# Patient Record
Sex: FEMALE | Race: WHITE | NOT HISPANIC OR LATINO | Employment: FULL TIME | URBAN - METROPOLITAN AREA
[De-identification: names, ages, dates, MRNs, and addresses within clinical notes are randomized per-mention and may not be internally consistent; named-entity substitution may affect disease eponyms.]

---

## 2019-03-26 ENCOUNTER — LAB VISIT (OUTPATIENT)
Dept: LAB | Facility: HOSPITAL | Age: 24
End: 2019-03-26
Attending: FAMILY MEDICINE
Payer: COMMERCIAL

## 2019-03-26 ENCOUNTER — CLINICAL SUPPORT (OUTPATIENT)
Dept: CARDIOLOGY | Facility: CLINIC | Age: 24
End: 2019-03-26
Payer: COMMERCIAL

## 2019-03-26 ENCOUNTER — OFFICE VISIT (OUTPATIENT)
Dept: INTERNAL MEDICINE | Facility: CLINIC | Age: 24
End: 2019-03-26
Payer: COMMERCIAL

## 2019-03-26 VITALS
HEIGHT: 69 IN | DIASTOLIC BLOOD PRESSURE: 74 MMHG | WEIGHT: 164 LBS | SYSTOLIC BLOOD PRESSURE: 122 MMHG | TEMPERATURE: 98 F | BODY MASS INDEX: 24.29 KG/M2 | OXYGEN SATURATION: 98 % | HEART RATE: 88 BPM

## 2019-03-26 DIAGNOSIS — E55.9 VITAMIN D DEFICIENCY: ICD-10-CM

## 2019-03-26 DIAGNOSIS — R42 POSTURAL DIZZINESS WITH PRESYNCOPE: ICD-10-CM

## 2019-03-26 DIAGNOSIS — R55 POSTURAL DIZZINESS WITH PRESYNCOPE: ICD-10-CM

## 2019-03-26 DIAGNOSIS — R53.83 FATIGUE, UNSPECIFIED TYPE: ICD-10-CM

## 2019-03-26 DIAGNOSIS — Z98.84 HISTORY OF WEIGHT LOSS SURGERY: ICD-10-CM

## 2019-03-26 DIAGNOSIS — E16.1 REACTIVE HYPOGLYCEMIA: ICD-10-CM

## 2019-03-26 DIAGNOSIS — Z98.84 HISTORY OF WEIGHT LOSS SURGERY: Primary | ICD-10-CM

## 2019-03-26 LAB
25(OH)D3+25(OH)D2 SERPL-MCNC: 61 NG/ML (ref 30–96)
ALBUMIN SERPL BCP-MCNC: 4.3 G/DL (ref 3.5–5.2)
ALP SERPL-CCNC: 65 U/L (ref 55–135)
ALT SERPL W/O P-5'-P-CCNC: 9 U/L (ref 10–44)
ANION GAP SERPL CALC-SCNC: 14 MMOL/L (ref 8–16)
AST SERPL-CCNC: 13 U/L (ref 10–40)
BASOPHILS # BLD AUTO: 0.04 K/UL (ref 0–0.2)
BASOPHILS NFR BLD: 0.4 % (ref 0–1.9)
BILIRUB SERPL-MCNC: 0.8 MG/DL (ref 0.1–1)
BUN SERPL-MCNC: 12 MG/DL (ref 6–20)
CALCIUM SERPL-MCNC: 10.4 MG/DL (ref 8.7–10.5)
CHLORIDE SERPL-SCNC: 105 MMOL/L (ref 95–110)
CO2 SERPL-SCNC: 23 MMOL/L (ref 23–29)
CREAT SERPL-MCNC: 0.8 MG/DL (ref 0.5–1.4)
DIFFERENTIAL METHOD: ABNORMAL
EOSINOPHIL # BLD AUTO: 0.1 K/UL (ref 0–0.5)
EOSINOPHIL NFR BLD: 0.5 % (ref 0–8)
ERYTHROCYTE [DISTWIDTH] IN BLOOD BY AUTOMATED COUNT: 13.2 % (ref 11.5–14.5)
EST. GFR  (AFRICAN AMERICAN): >60 ML/MIN/1.73 M^2
EST. GFR  (NON AFRICAN AMERICAN): >60 ML/MIN/1.73 M^2
FERRITIN SERPL-MCNC: 49 NG/ML (ref 20–300)
FOLATE SERPL-MCNC: 16.2 NG/ML (ref 4–24)
GLUCOSE SERPL-MCNC: 79 MG/DL (ref 70–110)
HCT VFR BLD AUTO: 41.1 % (ref 37–48.5)
HGB BLD-MCNC: 13.2 G/DL (ref 12–16)
IMM GRANULOCYTES # BLD AUTO: 0.03 K/UL (ref 0–0.04)
IMM GRANULOCYTES NFR BLD AUTO: 0.3 % (ref 0–0.5)
IRON SERPL-MCNC: 131 UG/DL (ref 30–160)
LYMPHOCYTES # BLD AUTO: 2.8 K/UL (ref 1–4.8)
LYMPHOCYTES NFR BLD: 24.9 % (ref 18–48)
MCH RBC QN AUTO: 30.3 PG (ref 27–31)
MCHC RBC AUTO-ENTMCNC: 32.1 G/DL (ref 32–36)
MCV RBC AUTO: 95 FL (ref 82–98)
MONOCYTES # BLD AUTO: 0.3 K/UL (ref 0.3–1)
MONOCYTES NFR BLD: 3 % (ref 4–15)
NEUTROPHILS # BLD AUTO: 8.1 K/UL (ref 1.8–7.7)
NEUTROPHILS NFR BLD: 71.2 % (ref 38–73)
NRBC BLD-RTO: 0 /100 WBC
PLATELET # BLD AUTO: 232 K/UL (ref 150–350)
PMV BLD AUTO: 10.7 FL (ref 9.2–12.9)
POTASSIUM SERPL-SCNC: 3.7 MMOL/L (ref 3.5–5.1)
PROT SERPL-MCNC: 7.7 G/DL (ref 6–8.4)
RBC # BLD AUTO: 4.35 M/UL (ref 4–5.4)
SATURATED IRON: 29 % (ref 20–50)
SODIUM SERPL-SCNC: 142 MMOL/L (ref 136–145)
TOTAL IRON BINDING CAPACITY: 451 UG/DL (ref 250–450)
TRANSFERRIN SERPL-MCNC: 305 MG/DL (ref 200–375)
VIT B12 SERPL-MCNC: 257 PG/ML (ref 210–950)
WBC # BLD AUTO: 11.32 K/UL (ref 3.9–12.7)

## 2019-03-26 PROCEDURE — 99999 PR PBB SHADOW E&M-NEW PATIENT-LVL III: CPT | Mod: PBBFAC,,, | Performed by: FAMILY MEDICINE

## 2019-03-26 PROCEDURE — 82746 ASSAY OF FOLIC ACID SERUM: CPT

## 2019-03-26 PROCEDURE — 82607 VITAMIN B-12: CPT

## 2019-03-26 PROCEDURE — 82306 VITAMIN D 25 HYDROXY: CPT

## 2019-03-26 PROCEDURE — 36415 COLL VENOUS BLD VENIPUNCTURE: CPT

## 2019-03-26 PROCEDURE — 85025 COMPLETE CBC W/AUTO DIFF WBC: CPT

## 2019-03-26 PROCEDURE — 3008F BODY MASS INDEX DOCD: CPT | Mod: CPTII,S$GLB,, | Performed by: FAMILY MEDICINE

## 2019-03-26 PROCEDURE — 99999 PR PBB SHADOW E&M-NEW PATIENT-LVL III: ICD-10-PCS | Mod: PBBFAC,,, | Performed by: FAMILY MEDICINE

## 2019-03-26 PROCEDURE — 93000 EKG 12-LEAD: ICD-10-PCS | Mod: S$GLB,,, | Performed by: INTERNAL MEDICINE

## 2019-03-26 PROCEDURE — 99204 OFFICE O/P NEW MOD 45 MIN: CPT | Mod: S$GLB,,, | Performed by: FAMILY MEDICINE

## 2019-03-26 PROCEDURE — 83540 ASSAY OF IRON: CPT

## 2019-03-26 PROCEDURE — 80053 COMPREHEN METABOLIC PANEL: CPT

## 2019-03-26 PROCEDURE — 99204 PR OFFICE/OUTPT VISIT, NEW, LEVL IV, 45-59 MIN: ICD-10-PCS | Mod: S$GLB,,, | Performed by: FAMILY MEDICINE

## 2019-03-26 PROCEDURE — 3008F PR BODY MASS INDEX (BMI) DOCUMENTED: ICD-10-PCS | Mod: CPTII,S$GLB,, | Performed by: FAMILY MEDICINE

## 2019-03-26 PROCEDURE — 93000 ELECTROCARDIOGRAM COMPLETE: CPT | Mod: S$GLB,,, | Performed by: INTERNAL MEDICINE

## 2019-03-26 PROCEDURE — 82728 ASSAY OF FERRITIN: CPT

## 2019-03-26 RX ORDER — NORETHINDRONE AND ETHINYL ESTRADIOL AND FERROUS FUMARATE 0.8-25(24)
KIT ORAL
COMMUNITY
Start: 2019-03-26 | End: 2019-12-09

## 2019-03-26 NOTE — PROGRESS NOTES
Subjective:       Patient ID: Dahlia Castro is a 23 y.o. female.    Chief Complaint: Dizziness and Fatigue    22 yo female here with several month history of presyncope upon standing. +fatigue  No dyspnea.  Seems to occur most often after meals.  Reports that she has been eating a normal amount and trying to incorporate multiple food groups and ensure adequate nutrition.  She has not yet added exercise.  She had a vertical gastric sleeve in 7/2018. [place] lab f[place]u [place] Lost 90 pounds in about 10 months.   Had normal EKG at work last week.  She is concerned about her nutritional status.    does not have any pertinent problems on file.  Past Medical History:   Diagnosis Date    Anxiety      Past Surgical History:   Procedure Laterality Date    none       Family History   Problem Relation Age of Onset    Diabetes Father     Kidney cancer Father     Diabetes Paternal Aunt     Diabetes Paternal Grandmother     Breast cancer Paternal Grandmother     Hypertension Paternal Grandmother     Hypertension Paternal Grandfather      Social History     Socioeconomic History    Marital status: Single     Spouse name: Not on file    Number of children: Not on file    Years of education: Not on file    Highest education level: Not on file   Occupational History    Not on file   Social Needs    Financial resource strain: Somewhat hard    Food insecurity:     Worry: Never true     Inability: Never true    Transportation needs:     Medical: No     Non-medical: No   Tobacco Use    Smoking status: Never Smoker   Substance and Sexual Activity    Alcohol use: No     Frequency: 2-4 times a month     Drinks per session: 3 or 4     Binge frequency: Less than monthly    Drug use: Not on file    Sexual activity: Not on file   Lifestyle    Physical activity:     Days per week: 2 days     Minutes per session: 40 min    Stress: To some extent   Relationships    Social connections:     Talks on  phone: More than three times a week     Gets together: Twice a week     Attends Mu-ism service: Not on file     Active member of club or organization: No     Attends meetings of clubs or organizations: Never     Relationship status: Never     Intimate partner violence:     Fear of current or ex partner: Not on file     Emotionally abused: Not on file     Physically abused: Not on file     Forced sexual activity: Not on file   Other Topics Concern    Not on file   Social History Narrative    Not on file     Review of Systems   Constitutional: Positive for activity change. Negative for unexpected weight change.   HENT: Negative for hearing loss, rhinorrhea and trouble swallowing.    Eyes: Negative for discharge and visual disturbance.   Respiratory: Negative for chest tightness and wheezing.    Cardiovascular: Positive for palpitations. Negative for chest pain.   Gastrointestinal: Negative for blood in stool, constipation, diarrhea and vomiting.   Endocrine: Negative for polydipsia and polyuria.   Genitourinary: Negative for difficulty urinating, dysuria, hematuria and menstrual problem.   Musculoskeletal: Positive for arthralgias. Negative for joint swelling and neck pain.   Neurological: Positive for weakness and headaches.   Psychiatric/Behavioral: Negative for confusion and dysphoric mood. The patient is nervous/anxious.        Objective:     Vitals:    03/26/19 1523   BP: 122/74   Pulse: 88   Temp: 98.1 °F (36.7 °C)        Physical Exam   Constitutional: She is oriented to person, place, and time. She appears well-developed and well-nourished.   HENT:   Head: Normocephalic and atraumatic.   Eyes: Pupils are equal, round, and reactive to light. EOM are normal.   Neck: Normal range of motion. Neck supple.   Cardiovascular: Normal rate, regular rhythm, normal heart sounds and intact distal pulses. Exam reveals no friction rub.   No murmur heard.  Pulmonary/Chest: Effort normal and breath sounds normal.  No stridor. No respiratory distress.   Abdominal: Soft. Bowel sounds are normal.   Musculoskeletal: Normal range of motion. She exhibits no deformity.   Neurological: She is alert and oriented to person, place, and time.   Skin: Skin is warm and dry.   Psychiatric: She has a normal mood and affect. Her behavior is normal.   Nursing note and vitals reviewed.      Assessment:       1. History of weight loss surgery    2. Fatigue, unspecified type    3. Vitamin D deficiency    4. Postural dizziness with presyncope    5. Reactive hypoglycemia        Plan:           Problem List Items Addressed This Visit        Endocrine    Reactive hypoglycemia    Overview     Discussed limiting carbohydrates during mealtime is well-appearing protein, healthy fats and carbohydrates to decrease spikes in insulin and subsequent feelings of hypoglycemia.           Other Visit Diagnoses     History of weight loss surgery    -  Primary    Relevant Orders    Comprehensive metabolic panel (Completed)    Vitamin D (Completed)    Folate (Completed)    Iron and TIBC (Completed)    Vitamin B12 (Completed)    Ferritin (Completed)    Fatigue, unspecified type        Relevant Orders    Comprehensive metabolic panel (Completed)    Folate (Completed)    Iron and TIBC (Completed)    Vitamin B12 (Completed)    Ferritin (Completed)    CBC auto differential (Completed)    Vitamin D deficiency        Relevant Orders    Vitamin D (Completed)    Postural dizziness with presyncope        Relevant Orders    EKG 12-lead (Completed)

## 2019-03-27 DIAGNOSIS — E53.8 B12 DEFICIENCY: Primary | ICD-10-CM

## 2019-03-27 RX ORDER — CYANOCOBALAMIN 500 UG/1
1 SPRAY, METERED NASAL WEEKLY
Qty: 4 BOTTLE | Refills: 6 | Status: SHIPPED | OUTPATIENT
Start: 2019-03-27 | End: 2019-12-10 | Stop reason: SDUPTHER

## 2019-03-28 PROBLEM — E16.1 REACTIVE HYPOGLYCEMIA: Status: ACTIVE | Noted: 2019-03-28

## 2019-11-06 ENCOUNTER — TELEPHONE (OUTPATIENT)
Dept: INTERNAL MEDICINE | Facility: CLINIC | Age: 24
End: 2019-11-06

## 2019-11-06 DIAGNOSIS — Z02.0 SCHOOL PHYSICAL EXAM: Primary | ICD-10-CM

## 2019-11-06 NOTE — TELEPHONE ENCOUNTER
----- Message from Genie Griffin sent at 11/6/2019  7:33 AM CST -----  Contact: pt  Please call pt @ [phone] regarding an order labs, pt will discuss with nurse.

## 2019-11-08 ENCOUNTER — PATIENT MESSAGE (OUTPATIENT)
Dept: INTERNAL MEDICINE | Facility: CLINIC | Age: 24
End: 2019-11-08

## 2019-11-11 ENCOUNTER — LAB VISIT (OUTPATIENT)
Dept: LAB | Facility: HOSPITAL | Age: 24
End: 2019-11-11
Attending: FAMILY MEDICINE
Payer: COMMERCIAL

## 2019-11-11 DIAGNOSIS — Z02.0 SCHOOL PHYSICAL EXAM: ICD-10-CM

## 2019-11-11 PROCEDURE — 36415 COLL VENOUS BLD VENIPUNCTURE: CPT

## 2019-11-11 PROCEDURE — 86787 VARICELLA-ZOSTER ANTIBODY: CPT

## 2019-11-11 PROCEDURE — 86762 RUBELLA ANTIBODY: CPT

## 2019-11-11 PROCEDURE — 86735 MUMPS ANTIBODY: CPT

## 2019-11-11 PROCEDURE — 86765 RUBEOLA ANTIBODY: CPT

## 2019-11-12 LAB
RUBV IGG SER-ACNC: 31.4 IU/ML
RUBV IGG SER-IMP: REACTIVE

## 2019-11-13 LAB
RUBEOLA IGG ANTIBODY: 1.81 ISR (ref 0–0.9)
RUBEOLA INTERPRETATION: POSITIVE

## 2019-11-14 LAB
MUMPS IGG INTERPRETATION: POSITIVE
MUMPS IGG SCREEN: 1.21 ISR (ref 0–0.9)
VARICELLA INTERPRETATION: POSITIVE
VARICELLA ZOSTER IGG: 1.77 ISR (ref 0–0.9)

## 2019-12-09 ENCOUNTER — LAB VISIT (OUTPATIENT)
Dept: LAB | Facility: HOSPITAL | Age: 24
End: 2019-12-09
Attending: FAMILY MEDICINE
Payer: COMMERCIAL

## 2019-12-09 ENCOUNTER — OFFICE VISIT (OUTPATIENT)
Dept: INTERNAL MEDICINE | Facility: CLINIC | Age: 24
End: 2019-12-09
Payer: COMMERCIAL

## 2019-12-09 VITALS
BODY MASS INDEX: 23.35 KG/M2 | OXYGEN SATURATION: 100 % | WEIGHT: 157.63 LBS | DIASTOLIC BLOOD PRESSURE: 74 MMHG | HEIGHT: 69 IN | HEART RATE: 81 BPM | SYSTOLIC BLOOD PRESSURE: 116 MMHG | TEMPERATURE: 99 F

## 2019-12-09 DIAGNOSIS — Z13.220 SCREENING, LIPID: ICD-10-CM

## 2019-12-09 DIAGNOSIS — E53.8 B12 DEFICIENCY: ICD-10-CM

## 2019-12-09 DIAGNOSIS — Z02.0 SCHOOL PHYSICAL EXAM: Primary | ICD-10-CM

## 2019-12-09 LAB
BASOPHILS # BLD AUTO: 0.09 K/UL (ref 0–0.2)
BASOPHILS NFR BLD: 1.1 % (ref 0–1.9)
DIFFERENTIAL METHOD: ABNORMAL
EOSINOPHIL # BLD AUTO: 0.1 K/UL (ref 0–0.5)
EOSINOPHIL NFR BLD: 1.3 % (ref 0–8)
ERYTHROCYTE [DISTWIDTH] IN BLOOD BY AUTOMATED COUNT: 13 % (ref 11.5–14.5)
HCT VFR BLD AUTO: 40.1 % (ref 37–48.5)
HGB BLD-MCNC: 12.6 G/DL (ref 12–16)
IMM GRANULOCYTES # BLD AUTO: 0.03 K/UL (ref 0–0.04)
IMM GRANULOCYTES NFR BLD AUTO: 0.4 % (ref 0–0.5)
LYMPHOCYTES # BLD AUTO: 2.5 K/UL (ref 1–4.8)
LYMPHOCYTES NFR BLD: 29.8 % (ref 18–48)
MCH RBC QN AUTO: 30.7 PG (ref 27–31)
MCHC RBC AUTO-ENTMCNC: 31.4 G/DL (ref 32–36)
MCV RBC AUTO: 98 FL (ref 82–98)
MONOCYTES # BLD AUTO: 0.3 K/UL (ref 0.3–1)
MONOCYTES NFR BLD: 3.6 % (ref 4–15)
NEUTROPHILS # BLD AUTO: 5.3 K/UL (ref 1.8–7.7)
NEUTROPHILS NFR BLD: 63.8 % (ref 38–73)
NRBC BLD-RTO: 0 /100 WBC
PLATELET # BLD AUTO: 244 K/UL (ref 150–350)
PMV BLD AUTO: 11.7 FL (ref 9.2–12.9)
RBC # BLD AUTO: 4.11 M/UL (ref 4–5.4)
WBC # BLD AUTO: 8.36 K/UL (ref 3.9–12.7)

## 2019-12-09 PROCEDURE — 99999 PR PBB SHADOW E&M-EST. PATIENT-LVL III: CPT | Mod: PBBFAC,,, | Performed by: FAMILY MEDICINE

## 2019-12-09 PROCEDURE — 85025 COMPLETE CBC W/AUTO DIFF WBC: CPT

## 2019-12-09 PROCEDURE — 36415 COLL VENOUS BLD VENIPUNCTURE: CPT

## 2019-12-09 PROCEDURE — 80061 LIPID PANEL: CPT

## 2019-12-09 PROCEDURE — 99395 PR PREVENTIVE VISIT,EST,18-39: ICD-10-PCS | Mod: S$GLB,,, | Performed by: FAMILY MEDICINE

## 2019-12-09 PROCEDURE — 82607 VITAMIN B-12: CPT

## 2019-12-09 PROCEDURE — 99395 PREV VISIT EST AGE 18-39: CPT | Mod: S$GLB,,, | Performed by: FAMILY MEDICINE

## 2019-12-09 PROCEDURE — 99999 PR PBB SHADOW E&M-EST. PATIENT-LVL III: ICD-10-PCS | Mod: PBBFAC,,, | Performed by: FAMILY MEDICINE

## 2019-12-09 RX ORDER — NORETHINDRONE AND ETHINYL ESTRADIOL AND FERROUS FUMARATE 0.8-25(24)
KIT ORAL
COMMUNITY
Start: 2019-03-26 | End: 2022-04-18

## 2019-12-09 NOTE — PROGRESS NOTES
Subjective:       Patient ID: Dahlia Castro is a 24 y.o. female.    Chief Complaint: school physical    23 yo female here for school physical. She has been doing well. Has h/o anxiety and bariatric surgery. No complications. Has not been taking nasal b12 but continues on multivitamin and oral b12.     does not have any pertinent problems on file.  Past Medical History:   Diagnosis Date    Anxiety      Past Surgical History:   Procedure Laterality Date    BARIATRIC SURGERY  2018    Vertical sleeve    none       Family History   Problem Relation Age of Onset    Diabetes Father     Kidney cancer Father     Diabetes Paternal Aunt     Diabetes Paternal Grandmother     Breast cancer Paternal Grandmother     Hypertension Paternal Grandmother     Hypertension Paternal Grandfather      Social History     Socioeconomic History    Marital status: Single     Spouse name: Not on file    Number of children: Not on file    Years of education: Not on file    Highest education level: Not on file   Occupational History    Not on file   Social Needs    Financial resource strain: Somewhat hard    Food insecurity:     Worry: Never true     Inability: Never true    Transportation needs:     Medical: No     Non-medical: No   Tobacco Use    Smoking status: Never Smoker   Substance and Sexual Activity    Alcohol use: No     Frequency: 2-4 times a month     Drinks per session: 3 or 4     Binge frequency: Less than monthly    Drug use: Not on file    Sexual activity: Not on file   Lifestyle    Physical activity:     Days per week: 2 days     Minutes per session: 40 min    Stress: To some extent   Relationships    Social connections:     Talks on phone: More than three times a week     Gets together: Twice a week     Attends Congregational service: Not on file     Active member of club or organization: No     Attends meetings of clubs or organizations: Never     Relationship status: Never    Other Topics Concern     Not on file   Social History Narrative    Not on file     Review of Systems   Constitutional: Negative for activity change and unexpected weight change.   HENT: Negative for hearing loss, rhinorrhea and trouble swallowing.    Eyes: Negative for discharge and visual disturbance.   Respiratory: Negative for chest tightness and wheezing.    Cardiovascular: Negative for chest pain and palpitations.   Gastrointestinal: Negative for blood in stool, constipation, diarrhea and vomiting.   Endocrine: Negative for polydipsia and polyuria.   Genitourinary: Negative for difficulty urinating, dysuria, hematuria and menstrual problem.   Musculoskeletal: Negative for arthralgias, joint swelling and neck pain.   Neurological: Negative for weakness and headaches.   Psychiatric/Behavioral: Negative for confusion and dysphoric mood.       Objective:     Vitals:    12/09/19 1323   BP: 116/74   Pulse: 81   Temp: 98.5 °F (36.9 °C)        Physical Exam   Constitutional: She is oriented to person, place, and time. She appears well-developed and well-nourished.   HENT:   Head: Normocephalic and atraumatic.   Eyes: Pupils are equal, round, and reactive to light. EOM are normal.   Neck: Normal range of motion. Neck supple.   Cardiovascular: Normal rate and regular rhythm.   Pulmonary/Chest: Effort normal and breath sounds normal.   Musculoskeletal: Normal range of motion. She exhibits no deformity.   Neurological: She is alert and oriented to person, place, and time.   Skin: Skin is warm and dry.   Psychiatric: She has a normal mood and affect. Her behavior is normal.   Nursing note and vitals reviewed.      Assessment:       1. School physical exam    2. B12 deficiency    3. Screening, lipid        Plan:           Problem List Items Addressed This Visit     None      Visit Diagnoses     School physical exam    -  Primary    B12 deficiency        Relevant Orders    CBC auto differential (Completed)    Vitamin B12 (Completed)     Screening, lipid        Relevant Orders    Lipid panel (Completed)      paperwork completed;

## 2019-12-10 DIAGNOSIS — E53.8 B12 DEFICIENCY: ICD-10-CM

## 2019-12-10 LAB
CHOLEST SERPL-MCNC: 182 MG/DL (ref 120–199)
CHOLEST/HDLC SERPL: 3.1 {RATIO} (ref 2–5)
HDLC SERPL-MCNC: 59 MG/DL (ref 40–75)
HDLC SERPL: 32.4 % (ref 20–50)
LDLC SERPL CALC-MCNC: 110 MG/DL (ref 63–159)
NONHDLC SERPL-MCNC: 123 MG/DL
TRIGL SERPL-MCNC: 65 MG/DL (ref 30–150)
VIT B12 SERPL-MCNC: 197 PG/ML (ref 210–950)

## 2019-12-10 RX ORDER — CYANOCOBALAMIN 500 UG/1
1 SPRAY, METERED NASAL WEEKLY
Qty: 4 BOTTLE | Refills: 11 | Status: SHIPPED | OUTPATIENT
Start: 2019-12-10 | End: 2020-09-26 | Stop reason: SDUPTHER

## 2020-09-26 ENCOUNTER — OFFICE VISIT (OUTPATIENT)
Dept: FAMILY MEDICINE | Facility: CLINIC | Age: 25
End: 2020-09-26
Payer: COMMERCIAL

## 2020-09-26 VITALS
HEIGHT: 69 IN | OXYGEN SATURATION: 99 % | SYSTOLIC BLOOD PRESSURE: 120 MMHG | HEART RATE: 103 BPM | TEMPERATURE: 98 F | DIASTOLIC BLOOD PRESSURE: 79 MMHG | BODY MASS INDEX: 25.5 KG/M2 | WEIGHT: 172.19 LBS

## 2020-09-26 DIAGNOSIS — Z76.89 ENCOUNTER TO ESTABLISH CARE: Primary | ICD-10-CM

## 2020-09-26 DIAGNOSIS — G40.909 NONINTRACTABLE EPILEPSY WITHOUT STATUS EPILEPTICUS, UNSPECIFIED EPILEPSY TYPE: ICD-10-CM

## 2020-09-26 DIAGNOSIS — E53.8 B12 DEFICIENCY: ICD-10-CM

## 2020-09-26 PROCEDURE — 99214 OFFICE O/P EST MOD 30 MIN: CPT | Mod: 25,S$GLB,, | Performed by: FAMILY MEDICINE

## 2020-09-26 PROCEDURE — 99999 PR PBB SHADOW E&M-EST. PATIENT-LVL III: ICD-10-PCS | Mod: PBBFAC,,, | Performed by: FAMILY MEDICINE

## 2020-09-26 PROCEDURE — 90686 FLU VACCINE (QUAD) GREATER THAN OR EQUAL TO 3YO PRESERVATIVE FREE IM: ICD-10-PCS | Mod: S$GLB,,, | Performed by: FAMILY MEDICINE

## 2020-09-26 PROCEDURE — 3008F BODY MASS INDEX DOCD: CPT | Mod: CPTII,S$GLB,, | Performed by: FAMILY MEDICINE

## 2020-09-26 PROCEDURE — 90471 FLU VACCINE (QUAD) GREATER THAN OR EQUAL TO 3YO PRESERVATIVE FREE IM: ICD-10-PCS | Mod: S$GLB,,, | Performed by: FAMILY MEDICINE

## 2020-09-26 PROCEDURE — 3008F PR BODY MASS INDEX (BMI) DOCUMENTED: ICD-10-PCS | Mod: CPTII,S$GLB,, | Performed by: FAMILY MEDICINE

## 2020-09-26 PROCEDURE — 99999 PR PBB SHADOW E&M-EST. PATIENT-LVL III: CPT | Mod: PBBFAC,,, | Performed by: FAMILY MEDICINE

## 2020-09-26 PROCEDURE — 90471 IMMUNIZATION ADMIN: CPT | Mod: S$GLB,,, | Performed by: FAMILY MEDICINE

## 2020-09-26 PROCEDURE — 90686 IIV4 VACC NO PRSV 0.5 ML IM: CPT | Mod: S$GLB,,, | Performed by: FAMILY MEDICINE

## 2020-09-26 PROCEDURE — 99214 PR OFFICE/OUTPT VISIT, EST, LEVL IV, 30-39 MIN: ICD-10-PCS | Mod: 25,S$GLB,, | Performed by: FAMILY MEDICINE

## 2020-09-26 RX ORDER — LAMOTRIGINE 25 MG/1
25 TABLET ORAL 2 TIMES DAILY
COMMUNITY
Start: 2020-09-22 | End: 2023-01-05

## 2020-09-26 RX ORDER — CYANOCOBALAMIN 500 UG/1
1 SPRAY NASAL WEEKLY
Qty: 4 BOTTLE | Refills: 11 | Status: SHIPPED | OUTPATIENT
Start: 2020-09-26 | End: 2021-09-26

## 2020-09-26 NOTE — PROGRESS NOTES
Subjective:      Patient ID: Dahlia Castro is a 25 y.o. female.    Chief Complaint: Establish Care    HPI    Patient with hx of epilepsy, b12 deficiency hx of vertical sleeve gastrectomy 2018, here today to establish care     Patient with hx of syncopal episode recently. Seen by [facility] and EEG was abnormal at this time. She was seen by neuro and was started on Lamictal this week. Feeling ok on it. [facility]. Recent thyroid normal and B12 normal. Checked recently by neurology. Has appointment with neurologist in 5 weeks for follow up.       Past Medical History:   Diagnosis Date    Anxiety     Seizures        Past Surgical History:   Procedure Laterality Date    BARIATRIC SURGERY  2018    Vertical sleeve    none         Family History   Problem Relation Age of Onset    Diabetes Father     Kidney cancer Father     Hyperlipidemia Father     Hypertension Father     Diabetes Paternal Aunt     Diabetes Paternal Grandmother     Breast cancer Paternal Grandmother     Hypertension Paternal Grandmother     Cancer Paternal Grandmother         breast cancer    Hypertension Paternal Grandfather     No Known Problems Mother     No Known Problems Sister     No Known Problems Brother        Social History     Socioeconomic History    Marital status: Single     Spouse name: Not on file    Number of children: 0    Years of education: Not on file    Highest education level: Not on file   Occupational History    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    Social Needs    Financial resource strain: Not very hard    Food insecurity     Worry: Never true     Inability: Never true    Transportation needs     Medical: No     Non-medical: No   Tobacco Use    Smoking status: Never Smoker    Smokeless tobacco: Never Used   Substance and Sexual Activity    Alcohol use: Yes     Frequency: Monthly or less     Drinks per session: 1 or 2     Binge frequency: Less than monthly     Comment:  once a month     Drug use: Never    Sexual activity: Yes     Partners: Male     Comment: hermelinda control    Lifestyle    Physical activity     Days per week: 3 days     Minutes per session: 60 min    Stress: Rather much   Relationships    Social connections     Talks on phone: Twice a week     Gets together: Once a week     Attends Faith service: Not on file     Active member of club or organization: Yes     Attends meetings of clubs or organizations: 1 to 4 times per year     Relationship status: Living with partner   Other Topics Concern    Not on file   Social History Narrative    Not on file       Health Maintenance Topics with due status: Not Due       Topic Last Completion Date    TETANUS VACCINE 07/19/2018    Pap Smear 09/11/2019       Medication List with Changes/Refills   Current Medications    LAMOTRIGINE (LAMICTAL) 25 MG TABLET    Take 25 mg by mouth 2 (two) times daily.    NORETH-ETHINYL ESTRADIOL-IRON (KAITLIB FE) 0.8MG-25MCG(24) AND 75 MG (4) CHEW    CHEW 1 TABLET BY MOUTH DAILY.   Changed and/or Refilled Medications    Modified Medication Previous Medication    CYANOCOBALAMIN, VITAMIN B-12, (NASCOBAL) 500 MCG/SPRAY SPRY cyanocobalamin, vitamin B-12, (NASCOBAL) 500 mcg/spray Spry       1 spray by Nasal route once a week.    1 spray by Nasal route once a week.   Discontinued Medications    FLUTICASONE (FLONASE) 50 MCG/ACTUATION NASAL SPRAY           Review of patient's allergies indicates:  No Known Allergies    Review of Systems   Constitutional: Negative for fever.   HENT: Negative for congestion and hearing loss.    Eyes: Negative for blurred vision and discharge.   Respiratory: Negative for shortness of breath and wheezing.    Cardiovascular: Negative for chest pain, palpitations and leg swelling.   Gastrointestinal: Negative for abdominal pain, blood in stool, constipation, diarrhea and vomiting.   Genitourinary: Negative for dysuria and hematuria.   Musculoskeletal: Negative for neck  pain.   Skin: Negative for rash.   Neurological: Positive for seizures. Negative for weakness and headaches.   Endo/Heme/Allergies: Negative for polydipsia.       Objective:     Vitals:    09/26/20 0906   BP: 120/79   Pulse: 103   Temp: 98.1 °F (36.7 °C)     Body mass index is 25.43 kg/m².    Physical Exam  Vitals signs and nursing note reviewed.   Constitutional:       General: She is not in acute distress.     Appearance: She is well-developed.   HENT:      Head: Normocephalic and atraumatic.      Right Ear: External ear normal.      Left Ear: External ear normal.      Nose: Nose normal.   Eyes:      Conjunctiva/sclera: Conjunctivae normal.      Pupils: Pupils are equal, round, and reactive to light.   Neck:      Thyroid: No thyromegaly.   Cardiovascular:      Rate and Rhythm: Normal rate and regular rhythm.      Heart sounds: Normal heart sounds. No murmur.   Pulmonary:      Effort: Pulmonary effort is normal. No respiratory distress.      Breath sounds: Normal breath sounds. No wheezing or rales.   Chest:      Chest wall: No tenderness.   Abdominal:      General: Bowel sounds are normal. There is no distension.      Palpations: Abdomen is soft.      Tenderness: There is no abdominal tenderness.   Lymphadenopathy:      Cervical: No cervical adenopathy.   Skin:     General: Skin is warm and dry.   Neurological:      Mental Status: She is alert and oriented to person, place, and time.         Assessment and Plan:     Encounter to establish care  B12 deficiency  -     cyanocobalamin, vitamin B-12, (NASCOBAL) 500 mcg/spray Spry; 1 spray by Nasal route once a week.  Dispense: 4 Bottle; Refill: 11  Will try to complete PA for this     Nonintractable epilepsy without status epilepticus, unspecified epilepsy type  Following neurology   lamictal       Follow up in about 6 months (around 3/26/2021).        Answers for HPI/ROS submitted by the patient on 9/23/2020   activity change: No  unexpected weight change:  No  rhinorrhea: No  trouble swallowing: No  visual disturbance: No  chest tightness: No  polyuria: No  difficulty urinating: No  menstrual problem: No  joint swelling: No  arthralgias: No  confusion: No  dysphoric mood: No

## 2020-09-30 ENCOUNTER — TELEPHONE (OUTPATIENT)
Dept: FAMILY MEDICINE | Facility: CLINIC | Age: 25
End: 2020-09-30

## 2020-09-30 NOTE — TELEPHONE ENCOUNTER
Called bill and spoke with her about PA for medication. Bill took fax number and stated that she will fax over a PA form for us.

## 2020-09-30 NOTE — TELEPHONE ENCOUNTER
----- Message from Ernestina Gould sent at 9/30/2020  3:31 PM CDT -----  Contact: Dahlia  Type:  Patient Returning Call    Who Called: Dahlia   Who Left Message for Patient: Gladys   Does the patient know what this is regarding?: prior authorization for medication   Would the patient rather a call back or a response via [facility]? Call back   Best Call Back Number: [phone]  Additional Information:     Miguelangel Matthews

## 2021-04-28 ENCOUNTER — PATIENT MESSAGE (OUTPATIENT)
Dept: RESEARCH | Facility: HOSPITAL | Age: 26
End: 2021-04-28

## 2022-04-27 ENCOUNTER — PATIENT MESSAGE (OUTPATIENT)
Dept: ADMINISTRATIVE | Facility: HOSPITAL | Age: 27
End: 2022-04-27
Payer: COMMERCIAL

## 2022-07-12 ENCOUNTER — PATIENT OUTREACH (OUTPATIENT)
Dept: ADMINISTRATIVE | Facility: HOSPITAL | Age: 27
End: 2022-07-12
Payer: COMMERCIAL

## 2022-07-12 NOTE — PROGRESS NOTES
Called patient to schedule overdue PCP appt, Patient did not answer, unable to leave voicemail

## 2022-09-01 ENCOUNTER — PATIENT OUTREACH (OUTPATIENT)
Dept: ADMINISTRATIVE | Facility: HOSPITAL | Age: 27
End: 2022-09-01
Payer: COMMERCIAL

## 2022-10-04 ENCOUNTER — PROCEDURE VISIT (OUTPATIENT)
Dept: SLEEP MEDICINE | Facility: HOSPITAL | Age: 27
End: 2022-10-04
Attending: SPECIALIST
Payer: COMMERCIAL

## 2022-10-04 ENCOUNTER — OFFICE VISIT (OUTPATIENT)
Dept: NEUROLOGY | Facility: CLINIC | Age: 27
End: 2022-10-04
Payer: COMMERCIAL

## 2022-10-04 VITALS
HEIGHT: 69 IN | DIASTOLIC BLOOD PRESSURE: 74 MMHG | SYSTOLIC BLOOD PRESSURE: 128 MMHG | WEIGHT: 175 LBS | BODY MASS INDEX: 25.92 KG/M2

## 2022-10-04 DIAGNOSIS — G40.309 GENERALIZED EPILEPSY: Primary | ICD-10-CM

## 2022-10-04 DIAGNOSIS — R56.9 NOCTURNAL SEIZURES: ICD-10-CM

## 2022-10-04 DIAGNOSIS — G40.309 GENERALIZED EPILEPSY: ICD-10-CM

## 2022-10-04 PROCEDURE — 1159F PR MEDICATION LIST DOCUMENTED IN MEDICAL RECORD: ICD-10-PCS | Mod: CPTII,S$GLB,, | Performed by: SPECIALIST

## 2022-10-04 PROCEDURE — 99205 PR OFFICE/OUTPT VISIT, NEW, LEVL V, 60-74 MIN: ICD-10-PCS | Mod: S$GLB,,, | Performed by: SPECIALIST

## 2022-10-04 PROCEDURE — 1159F MED LIST DOCD IN RCRD: CPT | Mod: CPTII,S$GLB,, | Performed by: SPECIALIST

## 2022-10-04 PROCEDURE — 3078F PR MOST RECENT DIASTOLIC BLOOD PRESSURE < 80 MM HG: ICD-10-PCS | Mod: CPTII,S$GLB,, | Performed by: SPECIALIST

## 2022-10-04 PROCEDURE — 3008F BODY MASS INDEX DOCD: CPT | Mod: CPTII,S$GLB,, | Performed by: SPECIALIST

## 2022-10-04 PROCEDURE — 3074F PR MOST RECENT SYSTOLIC BLOOD PRESSURE < 130 MM HG: ICD-10-PCS | Mod: CPTII,S$GLB,, | Performed by: SPECIALIST

## 2022-10-04 PROCEDURE — 99205 OFFICE O/P NEW HI 60 MIN: CPT | Mod: S$GLB,,, | Performed by: SPECIALIST

## 2022-10-04 PROCEDURE — 95819 EEG AWAKE AND ASLEEP: CPT

## 2022-10-04 PROCEDURE — 3008F PR BODY MASS INDEX (BMI) DOCUMENTED: ICD-10-PCS | Mod: CPTII,S$GLB,, | Performed by: SPECIALIST

## 2022-10-04 PROCEDURE — 99999 PR PBB SHADOW E&M-EST. PATIENT-LVL III: CPT | Mod: PBBFAC,,, | Performed by: SPECIALIST

## 2022-10-04 PROCEDURE — 99999 PR PBB SHADOW E&M-EST. PATIENT-LVL III: ICD-10-PCS | Mod: PBBFAC,,, | Performed by: SPECIALIST

## 2022-10-04 PROCEDURE — 3074F SYST BP LT 130 MM HG: CPT | Mod: CPTII,S$GLB,, | Performed by: SPECIALIST

## 2022-10-04 PROCEDURE — 3078F DIAST BP <80 MM HG: CPT | Mod: CPTII,S$GLB,, | Performed by: SPECIALIST

## 2022-10-04 RX ORDER — LEVETIRACETAM 500 MG/1
500 TABLET ORAL 2 TIMES DAILY
Qty: 180 TABLET | Refills: 3 | Status: SHIPPED | OUTPATIENT
Start: 2022-10-04 | End: 2023-01-05 | Stop reason: SDUPTHER

## 2022-10-04 NOTE — PROCEDURES
EEG,w/awake & drowsy record    Date/Time: 10/4/2022 8:30 AM  Performed by: Balta Kerr MD  Authorized by: Balta Kerr MD     EEG REPORT    PATIENT: Dahlia Sharma  : 1995    INTERPRETING PHYSICIAN: Balta Kerr     Reason for EEG: awakened husb this am having a seizure       Digital EEG reviewed.  Electrodes placed in customary 10-20 fashion.    Video recorded:   _._yes   __no     Duration of recordin   minutes     Patient awake, drowsy, and asleep.     The resting posterior background activity consists of symmetrical background theta and alpha activity.    Lateralizing, focal, or epileptiform features were not present.  Vaguely sharp character present in the early portions of the recording.     Activations:          HV performed and was not associated with abnormalities.       Photic performed and not associated with abnormalities.      Technical character:  good      EKG:  sinus    IMPRESSION: This is a nonspecific EEG.     Comments:   The lack of focal or clear epileptiform features does not negate or exclude a diagnosis of seizure or epilepsy, as the sensitivity of interictal EEG may be < or equal to 50%.    Balta Kerr MD [facility]

## 2022-10-04 NOTE — PROGRESS NOTES
Subjective:       Patient ID: Dahlia Sharma is a 27 y.o. female.    Chief Complaint: husb awakened to her seizing this am     HPI:            Seizures (New patient for seizures. Patient reports she had a seizure this morning around 5am with loss of conciousness witnessed by her  who states the seizure lasted appx 2 minutes. This is the second incident. First time was August 2020)  Took lamotrigine for about one year 220-2021   Does not recall specific side effects but recalls maintaining a therapeutical level was challenging and maybe had one minor spell while on the med   They had discussed LVT/Keppra but chose LTG due to possible mood se's with LVT; she'd not ever taken Keppra     notes may also be on facesheet for HPI, ROS, and other sections     Review of Systems  She did go on bachelor party this weekend  Denies etoh night before seizure but had etoh this weekend   Denies cardiac hx or hx head trauma         Social History     Socioeconomic History    Marital status:     Number of children: 0   Occupational History    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    Tobacco Use    Smoking status: Never    Smokeless tobacco: Never   Substance and Sexual Activity    Alcohol use: Yes     Comment: once a month     Drug use: Never    Sexual activity: Yes     Partners: Male     ----------------------------  Anxiety  History of other drug therapy      Comment:  KRISTEN Burden Historical - Unknown: COVID-19 vaccine                series completed-[employer]  History of other drug therapy      Comment:  KRISTEN Burden Historical - Unknown: COVID-19 vaccine                series completed-[employer]  Other B-complex deficiencies      Comment:  KRISTEN Budren Historical - Unknown: B12 deficiency-No                Additional Notes  Other B-complex deficiencies      Comment:  KRISTEN Burden Historical - Unknown: B12 deficiency-No                Additional Notes  Seizures      Current Outpatient Medications:       levETIRAcetam (KEPPRA)  "500 MG Tab, Take 1 tablet (500 mg total) by mouth 2 (two) times daily., Disp: 180 tablet, Rfl: 3    noreth-ethinyl estradioL-iron (LAYOLIS FE) 0.8mg-25mcg(24) and 75 mg (4) Chew, CHEW AND SWALLOW ONE TABLET BY MOUTH DAILY (Patient not taking: Reported on 10/4/2022), Disp: 84 tablet, Rfl: 1     Objective:        Exam:   /74 (BP Location: Left arm, Patient Position: Sitting)   Ht 5' 9" (1.753 m)   Wt 79.4 kg (175 lb)   BMI 25.84 kg/m²     General Exam  __unaccompanied  if accompanied, by__ h  body habitus_ Body mass index is 25.84 kg/m².    mental status_alert and appropriate  oropharynx_Mallampati grade_ 2  Tongue lac R   neck_  Heart__ RRR    Neurological:  cortical function__  Normal     Speech __ normal   cranial nerves:  CN 2 VF_ok   fundi_ normal   CN 3, 4, 6 EOMs_ok  CN 3, pupils_ok    CN 7_no lower face asymmetry  CN 8_hearing _ normal   CN 12 tongue_ok    Motor__ all groups normal arose fr chair w arms folded   tone:   muscle stretch reflexes__ normal   Vib sens_  normal   Pin sens_  Plantars__ flat   tremor: _ nil   coordination: _ F to N ok   gait_ normal incl toes heels and tandem   Romberg: neg     Neuroimaging:  CT head in past reportedly normal and she reports subsequent getting MRI which was also ok but I cannot see images of either this AM     EEG:   She was told had an abnl EEG but no  specific recollection of details   Had a normal one initially I think she shared     Labs:      _._ new patient here and/or   ___ multiple issues/ diagnoses or problems [if not enumerated in note then discussed in encounter but chose to or failed to document]    complexity of data   _._high _mod   __ images and reports reviewed:  _._ hx obtained from family or accompaniment:   __other studies reviewed   _._studies ordered __   __studies considered or discussed but not ordered __  __DDx discussed __    risks  _._high _mod   _._  seiz d.o. with possib of recurr seiz's   _._ CNS meds (and/or) potentially high " risk non CNS meds which may cause medical or behavioral side effects  __ fall risk  _._ driving discussed   adv not to drive until seiz free at least 6 mos   __other: sleep deprivation and certain meds to avoid discussed   Seiz precautions discussed       MDM/Medical Decision Making used for CPT choice based on above elements:  _._high _moderate         Assessment/Plan:       Problem List Items Addressed This Visit          Neuro    Generalized epilepsy - Primary    Nocturnal seizures             Other comments/ follow up:        Imaging orders(if any):   Orders Placed This Encounter   Procedures    EEG,w/awake & drowsy record      Medications Ordered This Encounter   Medications    levETIRAcetam (KEPPRA) 500 MG Tab     Sig: Take 1 tablet (500 mg total) by mouth 2 (two) times daily.     Dispense:  180 tablet     Refill:  3        Aim to visit with her this afternoon after EEG and then fu likely in 6 mos unless clinically indicated sooner     Balta Kerr MD [facility]

## 2023-01-05 ENCOUNTER — OFFICE VISIT (OUTPATIENT)
Dept: NEUROLOGY | Facility: CLINIC | Age: 28
End: 2023-01-05
Payer: COMMERCIAL

## 2023-01-05 VITALS
SYSTOLIC BLOOD PRESSURE: 120 MMHG | BODY MASS INDEX: 25.92 KG/M2 | WEIGHT: 175 LBS | HEIGHT: 69 IN | DIASTOLIC BLOOD PRESSURE: 84 MMHG

## 2023-01-05 DIAGNOSIS — G40.009 LOCALIZATION-RELATED (FOCAL) (PARTIAL) IDIOPATHIC EPILEPSY AND EPILEPTIC SYNDROMES WITH SEIZURES OF LOCALIZED ONSET, NOT INTRACTABLE, WITHOUT STATUS EPILEPTICUS: Primary | ICD-10-CM

## 2023-01-05 PROCEDURE — 3008F PR BODY MASS INDEX (BMI) DOCUMENTED: ICD-10-PCS | Mod: CPTII,S$GLB,, | Performed by: SPECIALIST

## 2023-01-05 PROCEDURE — 3079F DIAST BP 80-89 MM HG: CPT | Mod: CPTII,S$GLB,, | Performed by: SPECIALIST

## 2023-01-05 PROCEDURE — 1159F MED LIST DOCD IN RCRD: CPT | Mod: CPTII,S$GLB,, | Performed by: SPECIALIST

## 2023-01-05 PROCEDURE — 1159F PR MEDICATION LIST DOCUMENTED IN MEDICAL RECORD: ICD-10-PCS | Mod: CPTII,S$GLB,, | Performed by: SPECIALIST

## 2023-01-05 PROCEDURE — 99999 PR PBB SHADOW E&M-EST. PATIENT-LVL III: ICD-10-PCS | Mod: PBBFAC,,, | Performed by: SPECIALIST

## 2023-01-05 PROCEDURE — 3008F BODY MASS INDEX DOCD: CPT | Mod: CPTII,S$GLB,, | Performed by: SPECIALIST

## 2023-01-05 PROCEDURE — 3079F PR MOST RECENT DIASTOLIC BLOOD PRESSURE 80-89 MM HG: ICD-10-PCS | Mod: CPTII,S$GLB,, | Performed by: SPECIALIST

## 2023-01-05 PROCEDURE — 3074F SYST BP LT 130 MM HG: CPT | Mod: CPTII,S$GLB,, | Performed by: SPECIALIST

## 2023-01-05 PROCEDURE — 99999 PR PBB SHADOW E&M-EST. PATIENT-LVL III: CPT | Mod: PBBFAC,,, | Performed by: SPECIALIST

## 2023-01-05 PROCEDURE — 99213 OFFICE O/P EST LOW 20 MIN: CPT | Mod: S$GLB,,, | Performed by: SPECIALIST

## 2023-01-05 PROCEDURE — 99213 PR OFFICE/OUTPT VISIT, EST, LEVL III, 20-29 MIN: ICD-10-PCS | Mod: S$GLB,,, | Performed by: SPECIALIST

## 2023-01-05 PROCEDURE — 3074F PR MOST RECENT SYSTOLIC BLOOD PRESSURE < 130 MM HG: ICD-10-PCS | Mod: CPTII,S$GLB,, | Performed by: SPECIALIST

## 2023-01-05 RX ORDER — FOLIC ACID 1 MG/1
1 TABLET ORAL DAILY
Qty: 90 TABLET | Refills: 3 | Status: SHIPPED | OUTPATIENT
Start: 2023-01-05 | End: 2023-05-16 | Stop reason: SDUPTHER

## 2023-01-05 RX ORDER — LEVETIRACETAM 500 MG/1
500 TABLET ORAL 2 TIMES DAILY
Qty: 180 TABLET | Refills: 3 | Status: SHIPPED | OUTPATIENT
Start: 2023-01-05 | End: 2023-08-01

## 2023-01-05 NOTE — PROGRESS NOTES
"Subjective:      Subjective:         Patient ID: Dahlia Sharma is a 27 y.o. female.    Chief Complaint: epilepsy/seizure follow up     HPI:           sz f/u (Here for sz f/u//Pt reports no known sz since last visit; taking Keppra 500 mg BID. 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    Tobacco Use    Smoking status: Never    Smokeless tobacco: Never   Substance and Sexual Activity    Alcohol use: Yes     Comment: once a month     Drug use: Never    Sexual activity: Yes     Partners: Male         Current Outpatient Medications:     folic acid (FOLVITE) 1 MG tablet, Take 1 tablet (1 mg total) by mouth once daily., Disp: 90 tablet, Rfl: 3    levETIRAcetam (KEPPRA) 500 MG Tab, Take 1 tablet (500 mg total) by mouth 2 (two) times daily., Disp: 180 tablet, Rfl: 3    noreth-ethinyl estradioL-iron (LAYOLIS FE) 0.8mg-25mcg(24) and 75 mg (4) Chew, CHEW AND SWALLOW ONE TABLET BY MOUTH DAILY (Patient not taking: Reported on 10/4/2022), Disp: 84 tablet, Rfl: 1     Objective:      Exam  /84 (BP Location: Left arm, Patient Position: Sitting)   Ht 5' 9" (1.753 m)   Wt 79.4 kg (175 lb)   BMI 25.84 kg/m²     General:   If accompanied, by:_  Heart__ RRR    Neurological    Speech: clear   vis fields: full   EOMs: full   Motor: good   coord: F to N   Gait: normal       Neuroimaging:  My comments: in remote past neuromed center and was told was negative  MRI CT    Prior EEG:   Nonspecific w me oct 2022; oct 2020 also nonspecific from her " account    Labs:    meds:          Assessment/Plan:       Problem List Items Addressed This Visit          Neuro    Localization-related (focal) (partial) idiopathic epilepsy and epileptic syndromes with seizures of localized onset, not intractable, without status epilepticus - Primary       Other comments/ follow up:      No orders of the defined types were placed in this encounter.    Medications Ordered This Encounter   Medications    folic acid (FOLVITE) 1 MG tablet     Sig: Take 1 tablet (1 mg total) by mouth once daily.     Dispense:  90 tablet     Refill:  3    levETIRAcetam (KEPPRA) 500 MG Tab     Sig: Take 1 tablet (500 mg total) by mouth 2 (two) times daily.     Dispense:  180 tablet     Refill:  3       Cont pres mgmt     Seizure medications can be associated with certain side effects, including memory dysfunction or mood disorders. Excessive daytime sleepiness may occur sometimes leading to car crashes.  Abrupt stoppage of anticonvulsant medications can be medically troublesome.        Aim follow up _12 months    Balta Kerr MD [facility]

## 2023-05-16 ENCOUNTER — TELEPHONE (OUTPATIENT)
Dept: NEUROLOGY | Facility: CLINIC | Age: 28
End: 2023-05-16
Payer: COMMERCIAL

## 2023-05-16 RX ORDER — FOLIC ACID 1 MG/1
4 TABLET ORAL DAILY
Qty: 360 TABLET | Refills: 3 | Status: SHIPPED | OUTPATIENT
Start: 2023-05-16 | End: 2024-05-15

## 2023-05-16 NOTE — TELEPHONE ENCOUNTER
----- Message from Melissa Grier sent at 2023  1:08 PM CDT -----  Regarding: folic acid dose  Tue 16-May-23 12:38p TAKEN  Patient Calls  To:          Office  From:        Dahlia Sharma  Phone:       [phone]  Patient:     Same  :         89  RegDr:      Dr Balta Kerr  Ref:         Calling about the folic acid dose  increase the nurse recommended     ClrID:    [phone]

## 2023-05-16 NOTE — TELEPHONE ENCOUNTER
Pt had vs w her gyn today and she recommended that being she is on keppra, that she should be on a higher dose of folic acid than 1mg; that she recommends 4mg, and is asking if you would be willing to prescribe that

## 2023-06-15 LAB
C TRACH RRNA SPEC QL PROBE: NEGATIVE
HBV SURFACE AG SERPL QL IA: NEGATIVE
HIV 1+2 AB+HIV1 P24 AG SERPL QL IA: NON REACTIVE
N GONORRHOEAE, AMPLIFIED DNA: NEGATIVE
RPR: NON REACTIVE
RUBELLA IMMUNE STATUS: NORMAL

## 2023-08-01 ENCOUNTER — OFFICE VISIT (OUTPATIENT)
Dept: NEUROLOGY | Facility: CLINIC | Age: 28
End: 2023-08-01
Payer: COMMERCIAL

## 2023-08-01 VITALS
HEIGHT: 69 IN | BODY MASS INDEX: 27.25 KG/M2 | WEIGHT: 184 LBS | SYSTOLIC BLOOD PRESSURE: 106 MMHG | DIASTOLIC BLOOD PRESSURE: 80 MMHG

## 2023-08-01 DIAGNOSIS — G40.009 LOCALIZATION-RELATED (FOCAL) (PARTIAL) IDIOPATHIC EPILEPSY AND EPILEPTIC SYNDROMES WITH SEIZURES OF LOCALIZED ONSET, NOT INTRACTABLE, WITHOUT STATUS EPILEPTICUS: Primary | ICD-10-CM

## 2023-08-01 DIAGNOSIS — Z3A.19 19 WEEKS GESTATION OF PREGNANCY: ICD-10-CM

## 2023-08-01 PROCEDURE — 3079F DIAST BP 80-89 MM HG: CPT | Mod: CPTII,S$GLB,, | Performed by: NURSE PRACTITIONER

## 2023-08-01 PROCEDURE — 3074F PR MOST RECENT SYSTOLIC BLOOD PRESSURE < 130 MM HG: ICD-10-PCS | Mod: CPTII,S$GLB,, | Performed by: NURSE PRACTITIONER

## 2023-08-01 PROCEDURE — 3074F SYST BP LT 130 MM HG: CPT | Mod: CPTII,S$GLB,, | Performed by: NURSE PRACTITIONER

## 2023-08-01 PROCEDURE — 3079F PR MOST RECENT DIASTOLIC BLOOD PRESSURE 80-89 MM HG: ICD-10-PCS | Mod: CPTII,S$GLB,, | Performed by: NURSE PRACTITIONER

## 2023-08-01 PROCEDURE — 3008F BODY MASS INDEX DOCD: CPT | Mod: CPTII,S$GLB,, | Performed by: NURSE PRACTITIONER

## 2023-08-01 PROCEDURE — 99214 OFFICE O/P EST MOD 30 MIN: CPT | Mod: S$GLB,,, | Performed by: NURSE PRACTITIONER

## 2023-08-01 PROCEDURE — 1159F PR MEDICATION LIST DOCUMENTED IN MEDICAL RECORD: ICD-10-PCS | Mod: CPTII,S$GLB,, | Performed by: NURSE PRACTITIONER

## 2023-08-01 PROCEDURE — 99999 PR PBB SHADOW E&M-EST. PATIENT-LVL III: ICD-10-PCS | Mod: PBBFAC,,, | Performed by: NURSE PRACTITIONER

## 2023-08-01 PROCEDURE — 3008F PR BODY MASS INDEX (BMI) DOCUMENTED: ICD-10-PCS | Mod: CPTII,S$GLB,, | Performed by: NURSE PRACTITIONER

## 2023-08-01 PROCEDURE — 99999 PR PBB SHADOW E&M-EST. PATIENT-LVL III: CPT | Mod: PBBFAC,,, | Performed by: NURSE PRACTITIONER

## 2023-08-01 PROCEDURE — 1159F MED LIST DOCD IN RCRD: CPT | Mod: CPTII,S$GLB,, | Performed by: NURSE PRACTITIONER

## 2023-08-01 PROCEDURE — 99214 PR OFFICE/OUTPT VISIT, EST, LEVL IV, 30-39 MIN: ICD-10-PCS | Mod: S$GLB,,, | Performed by: NURSE PRACTITIONER

## 2023-08-01 RX ORDER — LEVETIRACETAM 750 MG/1
750 TABLET ORAL 2 TIMES DAILY
Qty: 180 TABLET | Refills: 3 | Status: SHIPPED | OUTPATIENT
Start: 2023-08-01 | End: 2023-09-05

## 2023-08-01 NOTE — PROGRESS NOTES
"  SEIZURE FOLLOW UP    SUBJECTIVE:  Patient ID: Dahlia Sharma   27 y.o.  Chief Complaint: Seizures (Currently 19 wks pregnant; having episodes of 10 sec bursts of poss sz like episodes, occurring 2-3/day over the last 3 wks or so/../Will have sudden increase in back ground noise, may take a couple of secs to process a conversation or thought, but able to speak and aware of surroundings and event; denies convulsion)      History of Present Illness:     Presents today for seizure follow up.     Currently 19 wks pregnant; having episodes of 10 sec bursts of poss sz like episodes, occurring 2-3/day over the last 3 wks or so .. Will have sudden increase in back ground noise, may take a couple of secs to process a conversation or thought, but able to speak and aware of surroundings and event; denies convulsion    [Prior to pregnancy denies any bursts]    Sleeping well at night    Review of Systems - as per HPI, otherwise a balanced 10 systems review is negative.  10/4/2022- Most recent seizure  First time was August 2020  Took lamotrigine for about one year [id number]     PFSH: Past medical, family, and social history reviewed as documented in chart     Current Medications:  Current Outpatient Medications   Medication Instructions    folic acid (FOLVITE) 4 mg, Oral, Daily    levETIRAcetam (KEPPRA) 500 mg, Oral, 2 times daily         OBJECTIVE:  Vitals:  /80   Ht 5' 9" (1.753 m)   Wt 83.5 kg (184 lb)   BMI 27.17 kg/m²     Physical Exam:  Constitutional: she appears well-developed and well-nourished. she is well groomed.    Head: Normocephalic and atraumatic  Resp: Respiratory effort is normal.   Cardiac: RRR  Musculoskeletal: Normal range of motion.   Skin: Skin is warm and dry.  Psychiatric: Normal mood and affect.     Neuro exam:    The patient is alert and oriented   Normal attention and concentration  Speech is normal   Pupils are equal and reactive to light.    Visual fields are full to confrontation testing. "   CN 8 - hearing is grossly normal  Motor - grossly normal  Gait - unassisted; posture upright. gait is steady with normal steps    Review of Data:   Notes reviewed   Imaging:  No results found for this or any previous visit.                  Past EEGs:        10/4/23: This is a nonspecific EEG.      Comments:   The lack of focal or clear epileptiform features does not negate or exclude a diagnosis of seizure or epilepsy, as the sensitivity of interictal EEG may be < or equal to 50%.      ASSESSMENT /PLAN:    Problem List Items Addressed This Visit          Neuro    Localization-related (focal) (partial) idiopathic epilepsy and epileptic syndromes with seizures of localized onset, not intractable, without status epilepticus - Primary    Increase Keppra to 750 mg, 1 BID    Continue Folic acid 4mg, 1 QD      Avoid medications such as Tramadol, Wellbutrin, Cipro, Levaquin     Avoid ladders/heights     Avoid binge drinking     Avoid sleep deprivation      - RTC in 9 wks      Pregnancy    19 wks pregnancy            Questions and concerns were sought and answered to the patient's stated verbal satisfaction.    The patient verbalizes understanding and agreement with the above stated treatment plan.   Dr. Kerr was available during today's encounter.     Items discussed include acute and/or chronic neurological, sleep, or other issues and their attendant differential diagnoses.  Potential for additional testing, treatment options, and prognosis also discussed.    __single dx _*__multiple issues/ diagnoses  __ low _*_mod ___ high complexity of data  __low _*_mod ___ high risks     Medical Decision Making (MDM) used for CPT choice:  ___low  __*_moderate  ____high          ANAY Steen  [facility]  [phone]   98

## 2023-09-05 ENCOUNTER — PATIENT MESSAGE (OUTPATIENT)
Dept: NEUROLOGY | Facility: CLINIC | Age: 28
End: 2023-09-05
Payer: COMMERCIAL

## 2023-09-05 DIAGNOSIS — G40.009 LOCALIZATION-RELATED (FOCAL) (PARTIAL) IDIOPATHIC EPILEPSY AND EPILEPTIC SYNDROMES WITH SEIZURES OF LOCALIZED ONSET, NOT INTRACTABLE, WITHOUT STATUS EPILEPTICUS: Primary | ICD-10-CM

## 2023-09-05 RX ORDER — LEVETIRACETAM 1000 MG/1
1000 TABLET ORAL 2 TIMES DAILY
Qty: 60 TABLET | Refills: 3 | Status: SHIPPED | OUTPATIENT
Start: 2023-09-05 | End: 2023-10-02 | Stop reason: SDUPTHER

## 2023-10-02 ENCOUNTER — OFFICE VISIT (OUTPATIENT)
Dept: NEUROLOGY | Facility: CLINIC | Age: 28
End: 2023-10-02
Payer: COMMERCIAL

## 2023-10-02 VITALS
BODY MASS INDEX: 27.99 KG/M2 | HEIGHT: 69 IN | DIASTOLIC BLOOD PRESSURE: 60 MMHG | WEIGHT: 189 LBS | SYSTOLIC BLOOD PRESSURE: 104 MMHG

## 2023-10-02 DIAGNOSIS — Z3A.27 27 WEEKS GESTATION OF PREGNANCY: ICD-10-CM

## 2023-10-02 DIAGNOSIS — G40.009 LOCALIZATION-RELATED (FOCAL) (PARTIAL) IDIOPATHIC EPILEPSY AND EPILEPTIC SYNDROMES WITH SEIZURES OF LOCALIZED ONSET, NOT INTRACTABLE, WITHOUT STATUS EPILEPTICUS: Primary | ICD-10-CM

## 2023-10-02 PROCEDURE — 3008F PR BODY MASS INDEX (BMI) DOCUMENTED: ICD-10-PCS | Mod: CPTII,S$GLB,, | Performed by: NURSE PRACTITIONER

## 2023-10-02 PROCEDURE — 1159F PR MEDICATION LIST DOCUMENTED IN MEDICAL RECORD: ICD-10-PCS | Mod: CPTII,S$GLB,, | Performed by: NURSE PRACTITIONER

## 2023-10-02 PROCEDURE — 99999 PR PBB SHADOW E&M-EST. PATIENT-LVL III: ICD-10-PCS | Mod: PBBFAC,,, | Performed by: NURSE PRACTITIONER

## 2023-10-02 PROCEDURE — 3074F PR MOST RECENT SYSTOLIC BLOOD PRESSURE < 130 MM HG: ICD-10-PCS | Mod: CPTII,S$GLB,, | Performed by: NURSE PRACTITIONER

## 2023-10-02 PROCEDURE — 3078F PR MOST RECENT DIASTOLIC BLOOD PRESSURE < 80 MM HG: ICD-10-PCS | Mod: CPTII,S$GLB,, | Performed by: NURSE PRACTITIONER

## 2023-10-02 PROCEDURE — 99214 PR OFFICE/OUTPT VISIT, EST, LEVL IV, 30-39 MIN: ICD-10-PCS | Mod: S$GLB,,, | Performed by: NURSE PRACTITIONER

## 2023-10-02 PROCEDURE — 1159F MED LIST DOCD IN RCRD: CPT | Mod: CPTII,S$GLB,, | Performed by: NURSE PRACTITIONER

## 2023-10-02 PROCEDURE — 99214 OFFICE O/P EST MOD 30 MIN: CPT | Mod: S$GLB,,, | Performed by: NURSE PRACTITIONER

## 2023-10-02 PROCEDURE — 3078F DIAST BP <80 MM HG: CPT | Mod: CPTII,S$GLB,, | Performed by: NURSE PRACTITIONER

## 2023-10-02 PROCEDURE — 3074F SYST BP LT 130 MM HG: CPT | Mod: CPTII,S$GLB,, | Performed by: NURSE PRACTITIONER

## 2023-10-02 PROCEDURE — 99999 PR PBB SHADOW E&M-EST. PATIENT-LVL III: CPT | Mod: PBBFAC,,, | Performed by: NURSE PRACTITIONER

## 2023-10-02 PROCEDURE — 3008F BODY MASS INDEX DOCD: CPT | Mod: CPTII,S$GLB,, | Performed by: NURSE PRACTITIONER

## 2023-10-02 RX ORDER — LEVETIRACETAM 1000 MG/1
1000 TABLET ORAL 2 TIMES DAILY
Qty: 60 TABLET | Refills: 5 | Status: SHIPPED | OUTPATIENT
Start: 2023-10-02 | End: 2024-02-05 | Stop reason: SDUPTHER

## 2023-10-02 NOTE — PROGRESS NOTES
"  SEIZURE FOLLOW UP    SUBJECTIVE:  Patient ID: Dahlia Sharma   28 y.o.  Chief Complaint: Seizures (Pt reports after few days following keppra dose increase, seizure events resolved; denies diff tolerating inc dose; denies mood changes, unusual headaches, dizziness)      History of Present Illness:     Presents today for seizure follow up.     27 wks preg; Due date Dec 27, 2023    Last visit, was havingdaily simple partial seizure activity (which I had experienced prior) & we ended up increasing my 500 BID Keppra to 750 BID.     Message 9/5/23:   Dose increase had been going very well up until the last 3-4 days.   Since then I have started to have some new seizure activity. The presentation is a little different but assume it is still a simple partial. It usually occurs with an audial disturbance first, wakes me up, nystagmus seems to be involved, and then I attempt to wake up my  in fear it will turn into a bigger seizure episode-- which is usually unsuccessful due to a tremor/ beating motion in my hand. No LOC, fully aware. Episodes last ~15 secs. Recurs q2-3h for the last 3-4 nights.    Keppra was increased to 1,000 mg, 1 BID    Pt reports after few days following keppra dose increase, seizure events resolved; denies diff tolerating inc dose; denies mood changes, unusual headaches, dizziness    Currently taking  Keppra 1,000mg, 1 BID ; denies side effects with medication    Sleeps well at night.        Review of Systems - as per HPI, otherwise a balanced 10 systems review is negative.    PFSH: Past medical, family, and social history reviewed as documented in chart     Current Medications:  Current Outpatient Medications   Medication Instructions    folic acid (FOLVITE) 4 mg, Oral, Daily    levETIRAcetam (KEPPRA) 1,000 mg, Oral, 2 times daily    prenatal no122/iron/folic acid (PRENATAL MULTI ORAL) Oral         OBJECTIVE:  Vitals:  /60   Ht 5' 9" (1.753 m)   Wt 85.7 kg (189 lb)   BMI 27.91 kg/m² "     Physical Exam:  Constitutional: she appears well-developed and well-nourished. she is well groomed.    Head: Normocephalic and atraumatic  Musculoskeletal: Normal range of motion.   Skin: Skin is warm and dry.  Psychiatric: Normal mood and affect.     Neuro exam:    The patient is alert and oriented   Normal attention and concentration  Speech is normal   Visual fields are full to confrontation testing.   CN 8 - hearing is grossly normal  Motor - grossly normal  Gait - unassisted; posture upright. gait is steady with normal steps        ASSESSMENT /PLAN:    Problem List Items Addressed This Visit          Neuro    Localization-related (focal) (partial) idiopathic epilepsy and epileptic syndromes with seizures of localized onset, not intractable, without status epilepticus - Primary    Continue Keppra 1,000mg, 1 BID  - importance of healthy diet, regular exercise and sleep hygiene discussed              - Risks of recurrent seizure discussed. seizure precautions discussed. Pt aware that unlawful to drive in [place] until seizure free at least 6 months.        Do not swim in pool alone     Do not bathe in tub full of water; shower preferred         Avoid medications such as Tramadol, Wellbutrin, Cipro, Levaquin     Avoid ladders/heights     Avoid binge drinking     Avoid sleep deprivation      - RTC scheduled for Jan     Pregnancy - 27 wks              Questions and concerns were sought and answered to the patient's stated verbal satisfaction.    The patient verbalizes understanding and agreement with the above stated treatment plan.   Dr. Kerr was available during today's encounter.     Items discussed include acute and/or chronic neurological, sleep, or other issues and their attendant differential diagnoses.  Potential for additional testing, treatment options, and prognosis also discussed.    __single dx _*__multiple issues/ diagnoses  __ low _*_mod ___ high complexity of data  __low _*_mod ___ high risks      Medical Decision Making (MDM) used for CPT choice:  ___low  __*_moderate  ____high          ANAY Steen  [facility]  [phone]

## 2023-11-27 ENCOUNTER — HOSPITAL ENCOUNTER (EMERGENCY)
Facility: HOSPITAL | Age: 28
Discharge: HOME | End: 2023-11-27
Payer: COMMERCIAL

## 2023-11-27 VITALS
TEMPERATURE: 98 F | DIASTOLIC BLOOD PRESSURE: 81 MMHG | HEART RATE: 90 BPM | SYSTOLIC BLOOD PRESSURE: 128 MMHG | RESPIRATION RATE: 18 BRPM

## 2023-11-27 PROCEDURE — 76819 FETAL BIOPHYS PROFIL W/O NST: CPT

## 2023-11-27 PROCEDURE — 96360 HYDRATION IV INFUSION INIT: CPT

## 2023-11-27 PROCEDURE — 63600175 PHARM REV CODE 636 W HCPCS: Performed by: OBSTETRICS & GYNECOLOGY

## 2023-11-27 PROCEDURE — 99284 EMERGENCY DEPT VISIT MOD MDM: CPT | Mod: 25

## 2023-11-27 RX ORDER — DEXTROSE, SODIUM CHLORIDE, SODIUM LACTATE, POTASSIUM CHLORIDE, AND CALCIUM CHLORIDE 5; .6; .31; .03; .02 G/100ML; G/100ML; G/100ML; G/100ML; G/100ML
INJECTION, SOLUTION INTRAVENOUS
Status: COMPLETED | OUTPATIENT
Start: 2023-11-27 | End: 2023-11-27

## 2023-11-27 RX ADMIN — SODIUM CHLORIDE, SODIUM LACTATE, POTASSIUM CHLORIDE, CALCIUM CHLORIDE AND DEXTROSE MONOHYDRATE: 5; 600; 310; 30; 20 INJECTION, SOLUTION INTRAVENOUS at 06:11

## 2023-11-30 LAB — PRENATAL STREP B CULTURE: NEGATIVE

## 2023-12-05 DIAGNOSIS — O36.5990 IUGR (INTRAUTERINE GROWTH RESTRICTION) AFFECTING CARE OF MOTHER: ICD-10-CM

## 2023-12-05 DIAGNOSIS — O36.5930 POOR FETAL GROWTH AFFECTING MANAGEMENT OF MOTHER IN THIRD TRIMESTER, SINGLE OR UNSPECIFIED FETUS: Primary | ICD-10-CM

## 2023-12-05 RX ORDER — OXYTOCIN/RINGER'S LACTATE 30/500 ML
95 PLASTIC BAG, INJECTION (ML) INTRAVENOUS ONCE
Status: CANCELLED | OUTPATIENT
Start: 2023-12-05 | End: 2023-12-05

## 2023-12-05 RX ORDER — SODIUM CHLORIDE, SODIUM LACTATE, POTASSIUM CHLORIDE, CALCIUM CHLORIDE 600; 310; 30; 20 MG/100ML; MG/100ML; MG/100ML; MG/100ML
INJECTION, SOLUTION INTRAVENOUS CONTINUOUS
Status: CANCELLED | OUTPATIENT
Start: 2023-12-05

## 2023-12-05 RX ORDER — OXYTOCIN/RINGER'S LACTATE 30/500 ML
334 PLASTIC BAG, INJECTION (ML) INTRAVENOUS ONCE
Status: CANCELLED | OUTPATIENT
Start: 2023-12-05 | End: 2023-12-05

## 2023-12-05 RX ORDER — CALCIUM CARBONATE 200(500)MG
500 TABLET,CHEWABLE ORAL 3 TIMES DAILY PRN
Status: CANCELLED | OUTPATIENT
Start: 2023-12-05

## 2023-12-05 RX ORDER — LIDOCAINE HYDROCHLORIDE 10 MG/ML
10 INJECTION INFILTRATION; PERINEURAL ONCE AS NEEDED
Status: CANCELLED | OUTPATIENT
Start: 2023-12-05 | End: 2035-05-03

## 2023-12-05 RX ORDER — SIMETHICONE 80 MG
1 TABLET,CHEWABLE ORAL 4 TIMES DAILY PRN
Status: CANCELLED | OUTPATIENT
Start: 2023-12-05

## 2023-12-05 RX ORDER — MISOPROSTOL 100 UG/1
800 TABLET ORAL ONCE AS NEEDED
Status: CANCELLED | OUTPATIENT
Start: 2023-12-05

## 2023-12-05 RX ORDER — ONDANSETRON 4 MG/1
8 TABLET, ORALLY DISINTEGRATING ORAL EVERY 8 HOURS PRN
Status: CANCELLED | OUTPATIENT
Start: 2023-12-05

## 2023-12-05 RX ORDER — MISOPROSTOL 100 UG/1
800 TABLET ORAL ONCE AS NEEDED
Status: CANCELLED | OUTPATIENT
Start: 2023-12-05 | End: 2035-05-03

## 2023-12-05 RX ORDER — DIPHENOXYLATE HYDROCHLORIDE AND ATROPINE SULFATE 2.5; .025 MG/1; MG/1
2 TABLET ORAL EVERY 6 HOURS PRN
Status: CANCELLED | OUTPATIENT
Start: 2023-12-05

## 2023-12-05 RX ORDER — CARBOPROST TROMETHAMINE 250 UG/ML
250 INJECTION, SOLUTION INTRAMUSCULAR
Status: CANCELLED | OUTPATIENT
Start: 2023-12-05

## 2023-12-05 RX ORDER — ACETAMINOPHEN 325 MG/1
650 TABLET ORAL EVERY 6 HOURS PRN
Status: CANCELLED | OUTPATIENT
Start: 2023-12-05

## 2023-12-05 RX ORDER — OXYTOCIN/RINGER'S LACTATE 30/500 ML
95 PLASTIC BAG, INJECTION (ML) INTRAVENOUS ONCE AS NEEDED
Status: CANCELLED | OUTPATIENT
Start: 2023-12-05 | End: 2035-05-03

## 2023-12-05 RX ORDER — OXYTOCIN/RINGER'S LACTATE 30/500 ML
334 PLASTIC BAG, INJECTION (ML) INTRAVENOUS ONCE AS NEEDED
Status: CANCELLED | OUTPATIENT
Start: 2023-12-05 | End: 2035-05-03

## 2023-12-05 RX ORDER — METHYLERGONOVINE MALEATE 0.2 MG/ML
200 INJECTION INTRAVENOUS ONCE AS NEEDED
Status: CANCELLED | OUTPATIENT
Start: 2023-12-05 | End: 2035-05-03

## 2023-12-05 RX ORDER — OXYTOCIN 10 [USP'U]/ML
10 INJECTION, SOLUTION INTRAMUSCULAR; INTRAVENOUS ONCE AS NEEDED
Status: CANCELLED | OUTPATIENT
Start: 2023-12-05 | End: 2035-05-03

## 2023-12-05 RX ORDER — PROCHLORPERAZINE EDISYLATE 5 MG/ML
5 INJECTION INTRAMUSCULAR; INTRAVENOUS EVERY 6 HOURS PRN
Status: CANCELLED | OUTPATIENT
Start: 2023-12-05

## 2023-12-05 RX ORDER — OXYTOCIN/RINGER'S LACTATE 30/500 ML
0-30 PLASTIC BAG, INJECTION (ML) INTRAVENOUS CONTINUOUS
Status: CANCELLED | OUTPATIENT
Start: 2023-12-05

## 2023-12-06 ENCOUNTER — HOSPITAL ENCOUNTER (INPATIENT)
Facility: HOSPITAL | Age: 28
LOS: 2 days | Discharge: HOME | End: 2023-12-08
Attending: OBSTETRICS & GYNECOLOGY | Admitting: OBSTETRICS & GYNECOLOGY
Payer: COMMERCIAL

## 2023-12-06 ENCOUNTER — ANESTHESIA (OUTPATIENT)
Dept: OBSTETRICS AND GYNECOLOGY | Facility: HOSPITAL | Age: 28
End: 2023-12-06
Payer: COMMERCIAL

## 2023-12-06 ENCOUNTER — ANESTHESIA EVENT (OUTPATIENT)
Dept: OBSTETRICS AND GYNECOLOGY | Facility: HOSPITAL | Age: 28
End: 2023-12-06
Payer: COMMERCIAL

## 2023-12-06 DIAGNOSIS — O36.5930 POOR FETAL GROWTH AFFECTING MANAGEMENT OF MOTHER IN THIRD TRIMESTER, SINGLE OR UNSPECIFIED FETUS: ICD-10-CM

## 2023-12-06 DIAGNOSIS — O36.5990 IUGR (INTRAUTERINE GROWTH RESTRICTION) AFFECTING CARE OF MOTHER: ICD-10-CM

## 2023-12-06 LAB
BASOPHILS # BLD AUTO: 0.06 X10(3)/MCL
BASOPHILS NFR BLD AUTO: 0.4 %
EOSINOPHIL # BLD AUTO: 0.09 X10(3)/MCL (ref 0–0.9)
EOSINOPHIL NFR BLD AUTO: 0.6 %
ERYTHROCYTE [DISTWIDTH] IN BLOOD BY AUTOMATED COUNT: 13.4 % (ref 11.5–17)
GROUP & RH: NORMAL
HCT VFR BLD AUTO: 35.6 % (ref 37–47)
HGB BLD-MCNC: 11.5 G/DL (ref 12–16)
IMM GRANULOCYTES # BLD AUTO: 0.13 X10(3)/MCL (ref 0–0.04)
IMM GRANULOCYTES NFR BLD AUTO: 0.9 %
INDIRECT COOMBS GEL: NORMAL
LYMPHOCYTES # BLD AUTO: 3.27 X10(3)/MCL (ref 0.6–4.6)
LYMPHOCYTES NFR BLD AUTO: 23.2 %
MCH RBC QN AUTO: 28.4 PG (ref 27–31)
MCHC RBC AUTO-ENTMCNC: 32.3 G/DL (ref 33–36)
MCV RBC AUTO: 87.9 FL (ref 80–94)
MONOCYTES # BLD AUTO: 0.53 X10(3)/MCL (ref 0.1–1.3)
MONOCYTES NFR BLD AUTO: 3.8 %
NEUTROPHILS # BLD AUTO: 10.01 X10(3)/MCL (ref 2.1–9.2)
NEUTROPHILS NFR BLD AUTO: 71.1 %
NRBC BLD AUTO-RTO: 0 %
PLATELET # BLD AUTO: 306 X10(3)/MCL (ref 130–400)
PMV BLD AUTO: 11 FL (ref 7.4–10.4)
RBC # BLD AUTO: 4.05 X10(6)/MCL (ref 4.2–5.4)
RH IMMUNE GLOBULIN: NORMAL
ROSETTE - FMH (FETAL BLEED SCREEN): NORMAL
SPECIMEN OUTDATE: NORMAL
T PALLIDUM AB SER QL: NONREACTIVE
WBC # SPEC AUTO: 14.09 X10(3)/MCL (ref 4.5–11.5)

## 2023-12-06 PROCEDURE — 86780 TREPONEMA PALLIDUM: CPT | Performed by: OBSTETRICS & GYNECOLOGY

## 2023-12-06 PROCEDURE — 59514 PRA REAN DELIVERY ONLY: ICD-10-PCS | Mod: QX,CRNA,, | Performed by: NURSE ANESTHETIST, CERTIFIED REGISTERED

## 2023-12-06 PROCEDURE — 63600175 PHARM REV CODE 636 W HCPCS: Performed by: OBSTETRICS & GYNECOLOGY

## 2023-12-06 PROCEDURE — 63600519 RHOGAM PHARM REV CODE 636 ALT 250 W HCPCS: Performed by: OBSTETRICS & GYNECOLOGY

## 2023-12-06 PROCEDURE — 25000003 PHARM REV CODE 250: Performed by: OBSTETRICS & GYNECOLOGY

## 2023-12-06 PROCEDURE — 27000492 HC SLEEVE, SCD T/L

## 2023-12-06 PROCEDURE — 72100002 HC LABOR CARE, 1ST 8 HOURS

## 2023-12-06 PROCEDURE — 85025 COMPLETE CBC W/AUTO DIFF WBC: CPT | Performed by: OBSTETRICS & GYNECOLOGY

## 2023-12-06 PROCEDURE — 85461 HEMOGLOBIN FETAL: CPT | Performed by: OBSTETRICS & GYNECOLOGY

## 2023-12-06 PROCEDURE — 63600175 PHARM REV CODE 636 W HCPCS: Performed by: NURSE ANESTHETIST, CERTIFIED REGISTERED

## 2023-12-06 PROCEDURE — 37000008 HC ANESTHESIA 1ST 15 MINUTES: Performed by: OBSTETRICS & GYNECOLOGY

## 2023-12-06 PROCEDURE — 63600175 PHARM REV CODE 636 W HCPCS: Performed by: ANESTHESIOLOGY

## 2023-12-06 PROCEDURE — 86900 BLOOD TYPING SEROLOGIC ABO: CPT | Performed by: OBSTETRICS & GYNECOLOGY

## 2023-12-06 PROCEDURE — 37000009 HC ANESTHESIA EA ADD 15 MINS: Performed by: OBSTETRICS & GYNECOLOGY

## 2023-12-06 PROCEDURE — 27201423 OPTIME MED/SURG SUP & DEVICES STERILE SUPPLY: Performed by: OBSTETRICS & GYNECOLOGY

## 2023-12-06 PROCEDURE — 25000003 PHARM REV CODE 250: Performed by: ANESTHESIOLOGY

## 2023-12-06 PROCEDURE — 71000033 HC RECOVERY, INTIAL HOUR: Performed by: OBSTETRICS & GYNECOLOGY

## 2023-12-06 PROCEDURE — 36004724 HC OB OR TIME LEV III - 1ST 15 MIN: Performed by: OBSTETRICS & GYNECOLOGY

## 2023-12-06 PROCEDURE — 59514 PRA REAN DELIVERY ONLY: ICD-10-PCS | Mod: AA,ANES,, | Performed by: ANESTHESIOLOGY

## 2023-12-06 PROCEDURE — 36004725 HC OB OR TIME LEV III - EA ADD 15 MIN: Performed by: OBSTETRICS & GYNECOLOGY

## 2023-12-06 PROCEDURE — 51702 INSERT TEMP BLADDER CATH: CPT

## 2023-12-06 PROCEDURE — 59514 CESAREAN DELIVERY ONLY: CPT | Mod: QX,CRNA,, | Performed by: NURSE ANESTHETIST, CERTIFIED REGISTERED

## 2023-12-06 PROCEDURE — 11000001 HC ACUTE MED/SURG PRIVATE ROOM

## 2023-12-06 PROCEDURE — 01968 ANES/ANALG CS DLVR NEURAXIAL: CPT | Mod: QY,ANES,, | Performed by: ANESTHESIOLOGY

## 2023-12-06 PROCEDURE — 99900059 HC C-SECTION ATTEND (STAT)

## 2023-12-06 PROCEDURE — 62326 NJX INTERLAMINAR LMBR/SAC: CPT | Performed by: ANESTHESIOLOGY

## 2023-12-06 PROCEDURE — 01968 PR INSERT CATH,ART,PERCUT,SHORTTERM: ICD-10-PCS | Mod: QY,ANES,, | Performed by: ANESTHESIOLOGY

## 2023-12-06 PROCEDURE — 01968 PR INSERT CATH,ART,PERCUT,SHORTTERM: ICD-10-PCS | Mod: QX,CRNA,, | Performed by: NURSE ANESTHETIST, CERTIFIED REGISTERED

## 2023-12-06 PROCEDURE — 72100003 HC LABOR CARE, EA. ADDL. 8 HRS

## 2023-12-06 PROCEDURE — 59514 CESAREAN DELIVERY ONLY: CPT | Mod: AA,ANES,, | Performed by: ANESTHESIOLOGY

## 2023-12-06 PROCEDURE — 25000003 PHARM REV CODE 250: Performed by: NURSE ANESTHETIST, CERTIFIED REGISTERED

## 2023-12-06 PROCEDURE — 99900035 HC TECH TIME PER 15 MIN (STAT)

## 2023-12-06 PROCEDURE — 01968 ANES/ANALG CS DLVR NEURAXIAL: CPT | Mod: QX,CRNA,, | Performed by: NURSE ANESTHETIST, CERTIFIED REGISTERED

## 2023-12-06 RX ORDER — PROPOFOL 10 MG/ML
VIAL (ML) INTRAVENOUS
Status: DISCONTINUED | OUTPATIENT
Start: 2023-12-06 | End: 2023-12-06

## 2023-12-06 RX ORDER — LEVETIRACETAM 500 MG/1
1000 TABLET ORAL 2 TIMES DAILY
Status: DISCONTINUED | OUTPATIENT
Start: 2023-12-06 | End: 2023-12-06

## 2023-12-06 RX ORDER — OXYCODONE AND ACETAMINOPHEN 5; 325 MG/1; MG/1
1 TABLET ORAL EVERY 4 HOURS PRN
Status: DISCONTINUED | OUTPATIENT
Start: 2023-12-06 | End: 2023-12-08 | Stop reason: HOSPADM

## 2023-12-06 RX ORDER — DIPHENOXYLATE HYDROCHLORIDE AND ATROPINE SULFATE 2.5; .025 MG/1; MG/1
2 TABLET ORAL EVERY 6 HOURS PRN
Status: DISCONTINUED | OUTPATIENT
Start: 2023-12-06 | End: 2023-12-08 | Stop reason: HOSPADM

## 2023-12-06 RX ORDER — LIDOCAINE HYDROCHLORIDE 10 MG/ML
10 INJECTION INFILTRATION; PERINEURAL ONCE AS NEEDED
Status: DISCONTINUED | OUTPATIENT
Start: 2023-12-06 | End: 2023-12-06

## 2023-12-06 RX ORDER — OXYTOCIN/RINGER'S LACTATE 30/500 ML
95 PLASTIC BAG, INJECTION (ML) INTRAVENOUS ONCE
Status: DISCONTINUED | OUTPATIENT
Start: 2023-12-06 | End: 2023-12-08 | Stop reason: HOSPADM

## 2023-12-06 RX ORDER — PHENYLEPHRINE HYDROCHLORIDE 10 MG/ML
INJECTION INTRAVENOUS
Status: DISCONTINUED | OUTPATIENT
Start: 2023-12-06 | End: 2023-12-06

## 2023-12-06 RX ORDER — IBUPROFEN 800 MG/1
800 TABLET ORAL EVERY 8 HOURS
Status: DISCONTINUED | OUTPATIENT
Start: 2023-12-07 | End: 2023-12-08 | Stop reason: HOSPADM

## 2023-12-06 RX ORDER — PROCHLORPERAZINE EDISYLATE 5 MG/ML
5 INJECTION INTRAMUSCULAR; INTRAVENOUS EVERY 6 HOURS PRN
Status: DISCONTINUED | OUTPATIENT
Start: 2023-12-06 | End: 2023-12-06

## 2023-12-06 RX ORDER — LEVETIRACETAM 500 MG/1
1000 TABLET ORAL 2 TIMES DAILY
Status: DISCONTINUED | OUTPATIENT
Start: 2023-12-06 | End: 2023-12-08 | Stop reason: HOSPADM

## 2023-12-06 RX ORDER — MISOPROSTOL 100 UG/1
800 TABLET ORAL ONCE AS NEEDED
Status: DISCONTINUED | OUTPATIENT
Start: 2023-12-06 | End: 2023-12-08 | Stop reason: HOSPADM

## 2023-12-06 RX ORDER — OXYTOCIN 10 [USP'U]/ML
10 INJECTION, SOLUTION INTRAMUSCULAR; INTRAVENOUS ONCE AS NEEDED
Status: DISCONTINUED | OUTPATIENT
Start: 2023-12-06 | End: 2023-12-08 | Stop reason: HOSPADM

## 2023-12-06 RX ORDER — LIDOCAINE HYDROCHLORIDE 20 MG/ML
INJECTION, SOLUTION EPIDURAL; INFILTRATION; INTRACAUDAL; PERINEURAL
Status: DISCONTINUED | OUTPATIENT
Start: 2023-12-06 | End: 2023-12-06

## 2023-12-06 RX ORDER — DIPHENHYDRAMINE HCL 25 MG
25 CAPSULE ORAL EVERY 4 HOURS PRN
Status: DISCONTINUED | OUTPATIENT
Start: 2023-12-06 | End: 2023-12-08 | Stop reason: HOSPADM

## 2023-12-06 RX ORDER — HYDROMORPHONE HYDROCHLORIDE 2 MG/ML
1 INJECTION, SOLUTION INTRAMUSCULAR; INTRAVENOUS; SUBCUTANEOUS EVERY 4 HOURS PRN
Status: DISCONTINUED | OUTPATIENT
Start: 2023-12-06 | End: 2023-12-08 | Stop reason: HOSPADM

## 2023-12-06 RX ORDER — MORPHINE SULFATE 0.5 MG/ML
INJECTION, SOLUTION EPIDURAL; INTRATHECAL; INTRAVENOUS
Status: DISCONTINUED | OUTPATIENT
Start: 2023-12-06 | End: 2023-12-06

## 2023-12-06 RX ORDER — AMOXICILLIN 250 MG
1 CAPSULE ORAL NIGHTLY PRN
Status: DISCONTINUED | OUTPATIENT
Start: 2023-12-06 | End: 2023-12-08 | Stop reason: HOSPADM

## 2023-12-06 RX ORDER — METHYLERGONOVINE MALEATE 0.2 MG/ML
200 INJECTION INTRAVENOUS ONCE AS NEEDED
Status: DISCONTINUED | OUTPATIENT
Start: 2023-12-06 | End: 2023-12-08 | Stop reason: HOSPADM

## 2023-12-06 RX ORDER — ONDANSETRON 2 MG/ML
INJECTION INTRAMUSCULAR; INTRAVENOUS
Status: DISCONTINUED | OUTPATIENT
Start: 2023-12-06 | End: 2023-12-06

## 2023-12-06 RX ORDER — METHYLERGONOVINE MALEATE 0.2 MG/ML
200 INJECTION INTRAVENOUS ONCE AS NEEDED
Status: DISCONTINUED | OUTPATIENT
Start: 2023-12-06 | End: 2023-12-06

## 2023-12-06 RX ORDER — CARBOPROST TROMETHAMINE 250 UG/ML
250 INJECTION, SOLUTION INTRAMUSCULAR
Status: DISCONTINUED | OUTPATIENT
Start: 2023-12-06 | End: 2023-12-06

## 2023-12-06 RX ORDER — OXYTOCIN 10 [USP'U]/ML
10 INJECTION, SOLUTION INTRAMUSCULAR; INTRAVENOUS ONCE AS NEEDED
Status: DISCONTINUED | OUTPATIENT
Start: 2023-12-06 | End: 2023-12-06

## 2023-12-06 RX ORDER — OXYTOCIN/RINGER'S LACTATE 30/500 ML
95 PLASTIC BAG, INJECTION (ML) INTRAVENOUS ONCE
Status: DISCONTINUED | OUTPATIENT
Start: 2023-12-06 | End: 2023-12-06 | Stop reason: SDUPTHER

## 2023-12-06 RX ORDER — OXYTOCIN/RINGER'S LACTATE 30/500 ML
0-30 PLASTIC BAG, INJECTION (ML) INTRAVENOUS CONTINUOUS
Status: DISCONTINUED | OUTPATIENT
Start: 2023-12-06 | End: 2023-12-06

## 2023-12-06 RX ORDER — NALOXONE HCL 0.4 MG/ML
0.02 VIAL (ML) INJECTION
Status: CANCELLED | OUTPATIENT
Start: 2023-12-06

## 2023-12-06 RX ORDER — MISOPROSTOL 100 UG/1
800 TABLET ORAL
Status: DISCONTINUED | OUTPATIENT
Start: 2023-12-06 | End: 2023-12-06

## 2023-12-06 RX ORDER — MISOPROSTOL 100 UG/1
800 TABLET ORAL ONCE AS NEEDED
Status: DISCONTINUED | OUTPATIENT
Start: 2023-12-06 | End: 2023-12-06

## 2023-12-06 RX ORDER — CEFAZOLIN SODIUM 2 G/50ML
2 SOLUTION INTRAVENOUS ONCE AS NEEDED
Status: DISCONTINUED | OUTPATIENT
Start: 2023-12-06 | End: 2023-12-06

## 2023-12-06 RX ORDER — OXYTOCIN/RINGER'S LACTATE 30/500 ML
95 PLASTIC BAG, INJECTION (ML) INTRAVENOUS ONCE
Status: DISCONTINUED | OUTPATIENT
Start: 2023-12-06 | End: 2023-12-06

## 2023-12-06 RX ORDER — OXYTOCIN/RINGER'S LACTATE 30/500 ML
334 PLASTIC BAG, INJECTION (ML) INTRAVENOUS ONCE
Status: DISCONTINUED | OUTPATIENT
Start: 2023-12-06 | End: 2023-12-06 | Stop reason: SDUPTHER

## 2023-12-06 RX ORDER — CALCIUM CARBONATE 200(500)MG
500 TABLET,CHEWABLE ORAL 3 TIMES DAILY PRN
Status: DISCONTINUED | OUTPATIENT
Start: 2023-12-06 | End: 2023-12-06

## 2023-12-06 RX ORDER — OXYTOCIN/RINGER'S LACTATE 30/500 ML
PLASTIC BAG, INJECTION (ML) INTRAVENOUS
Status: DISCONTINUED | OUTPATIENT
Start: 2023-12-06 | End: 2023-12-06

## 2023-12-06 RX ORDER — CEFAZOLIN SODIUM 1 G/3ML
INJECTION, POWDER, FOR SOLUTION INTRAMUSCULAR; INTRAVENOUS
Status: DISCONTINUED | OUTPATIENT
Start: 2023-12-06 | End: 2023-12-06

## 2023-12-06 RX ORDER — CARBOPROST TROMETHAMINE 250 UG/ML
250 INJECTION, SOLUTION INTRAMUSCULAR
Status: DISCONTINUED | OUTPATIENT
Start: 2023-12-06 | End: 2023-12-08 | Stop reason: HOSPADM

## 2023-12-06 RX ORDER — SIMETHICONE 80 MG
1 TABLET,CHEWABLE ORAL EVERY 6 HOURS PRN
Status: DISCONTINUED | OUTPATIENT
Start: 2023-12-06 | End: 2023-12-08 | Stop reason: HOSPADM

## 2023-12-06 RX ORDER — OXYTOCIN/RINGER'S LACTATE 30/500 ML
334 PLASTIC BAG, INJECTION (ML) INTRAVENOUS ONCE
Status: DISCONTINUED | OUTPATIENT
Start: 2023-12-06 | End: 2023-12-06

## 2023-12-06 RX ORDER — ADHESIVE BANDAGE
30 BANDAGE TOPICAL 2 TIMES DAILY PRN
Status: DISCONTINUED | OUTPATIENT
Start: 2023-12-07 | End: 2023-12-08 | Stop reason: HOSPADM

## 2023-12-06 RX ORDER — OXYTOCIN/RINGER'S LACTATE 30/500 ML
95 PLASTIC BAG, INJECTION (ML) INTRAVENOUS ONCE AS NEEDED
Status: DISCONTINUED | OUTPATIENT
Start: 2023-12-06 | End: 2023-12-08 | Stop reason: HOSPADM

## 2023-12-06 RX ORDER — SIMETHICONE 80 MG
1 TABLET,CHEWABLE ORAL 4 TIMES DAILY PRN
Status: DISCONTINUED | OUTPATIENT
Start: 2023-12-06 | End: 2023-12-06

## 2023-12-06 RX ORDER — FENTANYL/BUPIVACAINE/NS/PF 2-1250MCG
PLASTIC BAG, INJECTION (ML) INJECTION CONTINUOUS
Status: CANCELLED | OUTPATIENT
Start: 2023-12-06

## 2023-12-06 RX ORDER — DOCUSATE SODIUM 100 MG/1
200 CAPSULE, LIQUID FILLED ORAL 2 TIMES DAILY
Status: DISCONTINUED | OUTPATIENT
Start: 2023-12-06 | End: 2023-12-08 | Stop reason: HOSPADM

## 2023-12-06 RX ORDER — DIPHENOXYLATE HYDROCHLORIDE AND ATROPINE SULFATE 2.5; .025 MG/1; MG/1
2 TABLET ORAL EVERY 6 HOURS PRN
Status: DISCONTINUED | OUTPATIENT
Start: 2023-12-06 | End: 2023-12-06

## 2023-12-06 RX ORDER — DEXAMETHASONE SODIUM PHOSPHATE 4 MG/ML
INJECTION, SOLUTION INTRA-ARTICULAR; INTRALESIONAL; INTRAMUSCULAR; INTRAVENOUS; SOFT TISSUE
Status: DISCONTINUED | OUTPATIENT
Start: 2023-12-06 | End: 2023-12-06

## 2023-12-06 RX ORDER — ONDANSETRON 4 MG/1
8 TABLET, ORALLY DISINTEGRATING ORAL EVERY 8 HOURS PRN
Status: DISCONTINUED | OUTPATIENT
Start: 2023-12-06 | End: 2023-12-08 | Stop reason: HOSPADM

## 2023-12-06 RX ORDER — PRENATAL WITH FERROUS FUM AND FOLIC ACID 3080; 920; 120; 400; 22; 1.84; 3; 20; 10; 1; 12; 200; 27; 25; 2 [IU]/1; [IU]/1; MG/1; [IU]/1; MG/1; MG/1; MG/1; MG/1; MG/1; MG/1; UG/1; MG/1; MG/1; MG/1; MG/1
1 TABLET ORAL DAILY
Status: DISCONTINUED | OUTPATIENT
Start: 2023-12-07 | End: 2023-12-08 | Stop reason: HOSPADM

## 2023-12-06 RX ORDER — METHYLERGONOVINE MALEATE 0.2 MG/ML
200 INJECTION INTRAVENOUS
Status: DISCONTINUED | OUTPATIENT
Start: 2023-12-06 | End: 2023-12-06

## 2023-12-06 RX ORDER — FENTANYL/BUPIVACAINE/NS/PF 2-1250MCG
PLASTIC BAG, INJECTION (ML) INJECTION CONTINUOUS PRN
Status: DISCONTINUED | OUTPATIENT
Start: 2023-12-06 | End: 2023-12-06

## 2023-12-06 RX ORDER — ACETAMINOPHEN 10 MG/ML
INJECTION, SOLUTION INTRAVENOUS
Status: DISCONTINUED | OUTPATIENT
Start: 2023-12-06 | End: 2023-12-06

## 2023-12-06 RX ORDER — FAMOTIDINE 10 MG/ML
20 INJECTION INTRAVENOUS
Status: DISCONTINUED | OUTPATIENT
Start: 2023-12-06 | End: 2023-12-06

## 2023-12-06 RX ORDER — ONDANSETRON 4 MG/1
8 TABLET, ORALLY DISINTEGRATING ORAL EVERY 8 HOURS PRN
Status: DISCONTINUED | OUTPATIENT
Start: 2023-12-06 | End: 2023-12-06

## 2023-12-06 RX ORDER — BUPIVACAINE HYDROCHLORIDE 2.5 MG/ML
INJECTION, SOLUTION EPIDURAL; INFILTRATION; INTRACAUDAL
Status: COMPLETED | OUTPATIENT
Start: 2023-12-06 | End: 2023-12-06

## 2023-12-06 RX ORDER — ACETAMINOPHEN 325 MG/1
650 TABLET ORAL EVERY 6 HOURS PRN
Status: DISCONTINUED | OUTPATIENT
Start: 2023-12-06 | End: 2023-12-06

## 2023-12-06 RX ORDER — SODIUM CHLORIDE, SODIUM LACTATE, POTASSIUM CHLORIDE, CALCIUM CHLORIDE 600; 310; 30; 20 MG/100ML; MG/100ML; MG/100ML; MG/100ML
INJECTION, SOLUTION INTRAVENOUS CONTINUOUS
Status: DISCONTINUED | OUTPATIENT
Start: 2023-12-06 | End: 2023-12-06

## 2023-12-06 RX ORDER — FENTANYL CITRATE 50 UG/ML
INJECTION, SOLUTION INTRAMUSCULAR; INTRAVENOUS
Status: DISCONTINUED | OUTPATIENT
Start: 2023-12-06 | End: 2023-12-06

## 2023-12-06 RX ORDER — OXYCODONE AND ACETAMINOPHEN 10; 325 MG/1; MG/1
1 TABLET ORAL EVERY 4 HOURS PRN
Status: DISCONTINUED | OUTPATIENT
Start: 2023-12-06 | End: 2023-12-08 | Stop reason: HOSPADM

## 2023-12-06 RX ORDER — OXYTOCIN/RINGER'S LACTATE 30/500 ML
334 PLASTIC BAG, INJECTION (ML) INTRAVENOUS ONCE AS NEEDED
Status: DISCONTINUED | OUTPATIENT
Start: 2023-12-06 | End: 2023-12-06

## 2023-12-06 RX ORDER — PROMETHAZINE HYDROCHLORIDE 25 MG/1
25 TABLET ORAL EVERY 6 HOURS PRN
Status: DISCONTINUED | OUTPATIENT
Start: 2023-12-06 | End: 2023-12-08 | Stop reason: HOSPADM

## 2023-12-06 RX ORDER — BISACODYL 10 MG
10 SUPPOSITORY, RECTAL RECTAL ONCE AS NEEDED
Status: DISCONTINUED | OUTPATIENT
Start: 2023-12-06 | End: 2023-12-08 | Stop reason: HOSPADM

## 2023-12-06 RX ORDER — SODIUM CITRATE AND CITRIC ACID MONOHYDRATE 334; 500 MG/5ML; MG/5ML
30 SOLUTION ORAL
Status: DISCONTINUED | OUTPATIENT
Start: 2023-12-06 | End: 2023-12-06

## 2023-12-06 RX ORDER — KETOROLAC TROMETHAMINE 30 MG/ML
30 INJECTION, SOLUTION INTRAMUSCULAR; INTRAVENOUS EVERY 8 HOURS
Status: COMPLETED | OUTPATIENT
Start: 2023-12-06 | End: 2023-12-07

## 2023-12-06 RX ORDER — OXYTOCIN/RINGER'S LACTATE 30/500 ML
95 PLASTIC BAG, INJECTION (ML) INTRAVENOUS ONCE AS NEEDED
Status: DISCONTINUED | OUTPATIENT
Start: 2023-12-06 | End: 2023-12-06

## 2023-12-06 RX ORDER — OXYTOCIN/RINGER'S LACTATE 30/500 ML
334 PLASTIC BAG, INJECTION (ML) INTRAVENOUS ONCE AS NEEDED
Status: DISCONTINUED | OUTPATIENT
Start: 2023-12-06 | End: 2023-12-08 | Stop reason: HOSPADM

## 2023-12-06 RX ADMIN — AZITHROMYCIN 500 MG: 500 INJECTION, POWDER, LYOPHILIZED, FOR SOLUTION INTRAVENOUS at 03:12

## 2023-12-06 RX ADMIN — BUPIVACAINE HYDROCHLORIDE 10 ML: 2.5 INJECTION, SOLUTION EPIDURAL; INFILTRATION; INTRACAUDAL; PERINEURAL at 01:12

## 2023-12-06 RX ADMIN — CEFAZOLIN 2 G: 330 INJECTION, POWDER, FOR SOLUTION INTRAMUSCULAR; INTRAVENOUS at 03:12

## 2023-12-06 RX ADMIN — DOCUSATE SODIUM 200 MG: 100 CAPSULE, LIQUID FILLED ORAL at 08:12

## 2023-12-06 RX ADMIN — Medication 2 MILLI-UNITS/MIN: at 06:12

## 2023-12-06 RX ADMIN — ONDANSETRON 4 MG: 2 INJECTION INTRAMUSCULAR; INTRAVENOUS at 03:12

## 2023-12-06 RX ADMIN — Medication 12 ML/HR: at 02:12

## 2023-12-06 RX ADMIN — MORPHINE SULFATE 3 MG: 0.5 INJECTION, SOLUTION EPIDURAL; INTRATHECAL; INTRAVENOUS at 03:12

## 2023-12-06 RX ADMIN — PHENYLEPHRINE HYDROCHLORIDE 100 MCG: 10 INJECTION INTRAVENOUS at 03:12

## 2023-12-06 RX ADMIN — ACETAMINOPHEN 1000 MG: 10 INJECTION, SOLUTION INTRAVENOUS at 03:12

## 2023-12-06 RX ADMIN — SODIUM CHLORIDE, POTASSIUM CHLORIDE, SODIUM LACTATE AND CALCIUM CHLORIDE: 600; 310; 30; 20 INJECTION, SOLUTION INTRAVENOUS at 06:12

## 2023-12-06 RX ADMIN — Medication 30 UNITS: at 03:12

## 2023-12-06 RX ADMIN — HUMAN RHO(D) IMMUNE GLOBULIN 300 MCG: 1500 SOLUTION INTRAMUSCULAR; INTRAVENOUS at 10:12

## 2023-12-06 RX ADMIN — FENTANYL CITRATE 100 MCG: 50 INJECTION, SOLUTION INTRAMUSCULAR; INTRAVENOUS at 03:12

## 2023-12-06 RX ADMIN — LIDOCAINE HYDROCHLORIDE 20 ML: 20 INJECTION, SOLUTION EPIDURAL; INFILTRATION; INTRACAUDAL; PERINEURAL at 03:12

## 2023-12-06 RX ADMIN — PROPOFOL 10 MG: 10 INJECTION, EMULSION INTRAVENOUS at 03:12

## 2023-12-06 RX ADMIN — DEXAMETHASONE SODIUM PHOSPHATE 4 MG: 4 INJECTION, SOLUTION INTRA-ARTICULAR; INTRALESIONAL; INTRAMUSCULAR; INTRAVENOUS; SOFT TISSUE at 03:12

## 2023-12-06 RX ADMIN — SODIUM CHLORIDE, POTASSIUM CHLORIDE, SODIUM LACTATE AND CALCIUM CHLORIDE: 600; 310; 30; 20 INJECTION, SOLUTION INTRAVENOUS at 03:12

## 2023-12-06 RX ADMIN — SODIUM CHLORIDE, POTASSIUM CHLORIDE, SODIUM LACTATE AND CALCIUM CHLORIDE: 600; 310; 30; 20 INJECTION, SOLUTION INTRAVENOUS at 10:12

## 2023-12-06 RX ADMIN — ONDANSETRON 8 MG: 4 TABLET, ORALLY DISINTEGRATING ORAL at 07:12

## 2023-12-06 RX ADMIN — LEVETIRACETAM 1000 MG: 500 TABLET, FILM COATED ORAL at 08:12

## 2023-12-06 RX ADMIN — KETOROLAC TROMETHAMINE 30 MG: 30 INJECTION, SOLUTION INTRAMUSCULAR at 10:12

## 2023-12-06 NOTE — ANESTHESIA PREPROCEDURE EVALUATION
12/06/2023  Dahlia Sharma is a 28 y.o., female.      Pre-op Assessment    I have reviewed the Patient Summary Reports.     I have reviewed the Nursing Notes. I have reviewed the NPO Status.   I have reviewed the Medications.     Review of Systems         Anesthesia Plan  Type of Anesthesia, risks & benefits discussed:    Anesthesia Type: Epidural  Post Op Pain Control Plan: multimodal analgesia  ASA Score: 1  Anesthesia Plan Notes: Epidural placed earlier by Dr. Gannon.    Ready For Surgery From Anesthesia Perspective.   .

## 2023-12-06 NOTE — L&D DELIVERY NOTE
Pre-op Diagnosis:   1.  Intrauterine Pregnancy at 37 weeks  2.  IUGR  3. Nonreassuring fetal heart tones    Postop Diagnosis: Same  Procedure: Primary Low Transverse  Section via Pfannenstiel incision  Surgeon: Sharmila Trejo MD  Anesthesia: Epidural  Complications: None  QBL: per RN  Findings: Normal uterus, tubes and ovaries.  Viable female infant with Apgars of 8,9 weighing 5lbs 5oz  Specimen: Placenta    Indication and Consent: Patient presented to L&D for induction. No cervical change beyond 4cm and fetus began to have recurrent late and variable decels.  I discussed risks, benefits and options with her in detail.  She desired to proceed with a primary  delivery. The patient understood that the risks of  section include, but are not limited to, visceral or vascular injury, infection, blood loss and the need for transfusion, prolonged hospitalization and reoperation.  The patient stated understanding and desired to proceed.  All questions were answered.     Procedure: She was taken to the operating room where epidural anesthesia was found to be adequate.  Ancef and Azithro was given for infection prophylaxis.  She was prepped and draped in the dorsal supine position with a leftward tilt.  A Pfannenstiel skin incision was made with the scalpel and carried down to the fascia with the Bovie.  The fascia was incised and extended laterally with the Bovie.  The superior aspect of the fascia was grasped with the Kocher clamps.  The underlying rectus muscle was dissected off sharply with Diaz scissors.  In a similar fashion, the inferior aspect of the fascia was elevated with the Kocher clamps and the rectus and pyramidalis muscles were dissected off.  Hemostasis was achieved with the Bovie.  The rectus muscle was  in the midline down to the level of the pubic symphysis.  Preperitoneal fatty tissue was bluntly dissected to expose the peritoneum.  The peritoneum was found to be free of  adherent bowel and entered sharply with scissors.  The peritoneal incision was extended superiorly and inferiorly to the bladder reflection with good visualization of the bladder.  The Shahid retractor was placed. The vesicouterine peritoneum identified, then opened with scissors and the bladder flap was developed.   The lower uterine segment was incised with a scalpel.  The amniotic sac was ruptured and clear fluid was noted.  The uterine incision was extended bluntly with lateral and upward traction.  The fetus was in cephalic presentation.  The head was gently elevated out of the pelvis and gentle fundal pressure was applied once the head was brought to the incision.  The infant was delivered without difficulty.  The mouth and nose were suctioned with bulb suction.  The cord was clamped and cut.  The infant was handed off to waiting NICU and respiratory personnel.  IV Pitocin was initiated to facilitate uterine contractions.  The placenta was delivered intact with manual massage of the uterine fundus.  The inside of the uterus was gently wiped with a lap sponge to assure complete removal of placental membranes.  The uterine incision was closed with a 0 Vicryl suture in a running locked fashion.  Blood clots and fluid were wiped out of the abdomen and pelvis with moist lap sponges.  The uterine incision was reinspected and good hemostasis was noted.  The Shahid retractor was removed.   The peritoneum was closed in a running fashion. The rectus muscles were loosely reapproximated.  The fascia was closed with 1-0 Vicryl in a continuous running fashion.  The subcuticular tissue was irrigated with warm normal saline.  Subcuticular tissue was reapproximated with plain gut.   Skin was closed using Monocryl in a subcuticular fashion.  The patient tolerated the procedure well.  All sponge and lap counts were correct times 2.  The patient was taken to the recovery room in stable condition.

## 2023-12-06 NOTE — PROGRESS NOTES
S: 28 y.o.  at 37w0d with EDC of 2023, comfortable with epidural.  Recurrent variable and late decels not responsive to d/c pitocin and resuscitative efforts.  Patient also with no cervical change since 1000    O:   BP: 114/72    FHT: persistent Cat II, moderate variability, remote from delivery  [place]/IUPC: irregular  SVE:480/-1 per RN      ASSESSMENT: 37w0d   Patient Active Problem List   Diagnosis    Anxiety    Syncope    Reactive hypoglycemia    Generalized epilepsy    Nocturnal seizures    Localization-related (focal) (partial) idiopathic epilepsy and epileptic syndromes with seizures of localized onset, not intractable, without status epilepticus    19 weeks gestation of pregnancy     37 wga, IUGR, Nonreassuring fetal heart tones    PLAN:    Recommend 1LTCS  R/B/A reviewed with patient and questions answered  Ancef/Gary/Ingrid for prophylaxis

## 2023-12-06 NOTE — ANESTHESIA PREPROCEDURE EVALUATION
12/06/2023  Dahlia Sharma is a 28 y.o., female with IUP in labor for labor epidural.      Pre-op Assessment    I have reviewed the Patient Summary Reports.     I have reviewed the Nursing Notes. I have reviewed the NPO Status.   I have reviewed the Medications.     Review of Systems  Anesthesia Hx:  No problems with previous Anesthesia             Denies Family Hx of Anesthesia complications.    Denies Personal Hx of Anesthesia complications.                    Cardiovascular:  Cardiovascular Normal           Denies Angina.                                  Pulmonary:  Pulmonary Normal      Denies Shortness of breath.                  Neurological:       Seizures                                    Physical Exam  General: Well nourished, Cooperative, Alert and Oriented    Airway:  Mallampati: II   Mouth Opening: Normal  TM Distance: Normal  Tongue: Normal  Neck ROM: Normal ROM    Dental:  Intact    Chest/Lungs:  Clear to auscultation, Normal Respiratory Rate    Heart:  Rate: Normal  Rhythm: Regular Rhythm        Anesthesia Plan  Type of Anesthesia, risks & benefits discussed:    Anesthesia Type: Epidural  Informed Consent: Informed consent signed with the Patient and all parties understand the risks and agree with anesthesia plan.  All questions answered.   ASA Score: 2  Day of Surgery Review of History & Physical: H&P Update referred to the surgeon/provider.    Ready For Surgery From Anesthesia Perspective.     .

## 2023-12-06 NOTE — ANESTHESIA POSTPROCEDURE EVALUATION
Anesthesia Post Evaluation    Patient: Dahlia Sharma    Procedure(s) Performed: Procedure(s) (LRB):   SECTION (N/A)    Final Anesthesia Type: epidural      Patient location during evaluation: labor & delivery  Patient participation: Yes- Able to Participate  Level of consciousness: awake and alert  Post-procedure vital signs: reviewed and stable  Pain management: adequate  Airway patency: patent  RENE mitigation strategies: Multimodal analgesia  PONV status at discharge: No PONV  Anesthetic complications: no      Cardiovascular status: blood pressure returned to baseline and hemodynamically stable  Respiratory status: unassisted  Hydration status: euvolemic  Follow-up not needed.          Vitals Value Taken Time   /70 23 1619   Temp 36.4 °C (97.6 °F) 23 1604   Pulse 74 23 1619   Resp 16 23 1619   SpO2 100 % 23 1619         Event Time   Out of Recovery 17:04:00         Pain/Cuco Score: Cuco Score: 9 (2023  4:04 PM)

## 2023-12-06 NOTE — TRANSFER OF CARE
Anesthesia Transfer of Care Note    Patient: Dahlia Sharma    Procedure(s) Performed: * No procedures listed *    Patient location: Labor and Delivery    Anesthesia Type: spinal    Transport from OR: Transported from OR on room air with adequate spontaneous ventilation    Post pain: adequate analgesia    Post assessment: no apparent anesthetic complications and tolerated procedure well    Post vital signs: stable    Level of consciousness: awake, alert and oriented    Nausea/Vomiting: no nausea/vomiting    Complications: none    Transfer of care protocol was followed

## 2023-12-06 NOTE — H&P
HISTORY AND PHYSICAL                                                OBSTETRICS          Subjective:      Dahlia Sharma is a 28 y.o.  female with IUP at 37w0d weeks gestation who presents to L&D for induction. Pertinent medical history for this pregnancy includes IUGR, seizure disorder, previous gastric sleeve.  Care this pregnancy has been with Dr. Bowen    PMHx:   Past Medical History:   Diagnosis Date    Anxiety     History of other drug therapy 2021 3:49:31 PM    [facility] Historical - Unknown: COVID-19 vaccine series completed-[employer]    History of other drug therapy 2021 3:49:31 PM    [facility] Historical - Unknown: COVID-19 vaccine series completed-[employer]    Other B-complex deficiencies 2020 3:24:16 PM    [facility] Historical - Unknown: B12 deficiency-No Additional Notes    Other B-complex deficiencies 2020 3:24:16 PM    [facility] Historical - Unknown: B12 deficiency-No Additional Notes    Seizures        PSHx:   Past Surgical History:   Procedure Laterality Date    BARIATRIC SURGERY  2018    Vertical sleeve    none         All: Review of patient's allergies indicates:  No Known Allergies    Meds:   Medications Prior to Admission   Medication Sig Dispense Refill Last Dose    folic acid (FOLVITE) 1 MG tablet Take 4 tablets (4 mg total) by mouth once daily. 360 tablet 3 2023    levETIRAcetam (KEPPRA) 1000 MG tablet Take 1 tablet (1,000 mg total) by mouth 2 (two) times daily. 60 tablet 5 2023    prenatal no122/iron/folic acid (PRENATAL MULTI ORAL) Take by mouth.          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    Tobacco Use    Smoking status: Never    Smokeless tobacco: Never   Substance and Sexual Activity    Alcohol use: Yes     Comment: once a month     Drug use: Never    Sexual activity: Yes     Partners: Male     Social Determinants of  Health     Financial Resource Strain: Low Risk  (2020)    Overall Financial Resource Strain (CARDIA)     Difficulty of Paying Living Expenses: Not very hard   Food Insecurity: No Food Insecurity (3/26/2019)    Hunger Vital Sign     Worried About Running Out of Food in the Last Year: Never true     Ran Out of Food in the Last Year: Never true   Transportation Needs: No Transportation Needs (3/26/2019)    PRAPARE - Transportation     Lack of Transportation (Medical): No     Lack of Transportation (Non-Medical): No   Physical Activity: Sufficiently Active (2020)    Exercise Vital Sign     Days of Exercise per Week: 3 days     Minutes of Exercise per Session: 60 min   Stress: Stress Concern Present (2020)    [employer] - Occupational Stress Questionnaire     Feeling of Stress : Rather much   Social Connections: Unknown (2020)    Social Connection and Isolation Panel [NHANES]     Frequency of Communication with Friends and Family: Twice a week     Frequency of Social Gatherings with Friends and Family: Once a week     Active Member of Clubs or Organizations: Yes     Attends Club or Organization Meetings: 1 to 4 times per year     Marital Status: Living with partner       FH:   Family History   Problem Relation Age of Onset    Diabetes Father     Kidney cancer Father     Hyperlipidemia Father     Hypertension Father     Diabetes Paternal Aunt     Diabetes Paternal Grandmother     Breast cancer Paternal Grandmother     Hypertension Paternal Grandmother     Cancer Paternal Grandmother         breast cancer    Hypertension Paternal Grandfather     No Known Problems Mother     No Known Problems Sister     No Known Problems Brother        OBHx:   OB History    Para Term  AB Living   1 0 0 0 0 0   SAB IAB Ectopic Multiple Live Births   0 0 0 0 0      # Outcome Date GA Lbr Ino/2nd Weight Sex Delivery Anes PTL Lv   1 Current                Objective:      /81    Pulse (!) 115   Breastfeeding No   There is no height or weight on file to calculate BMI.    General:   alert and cooperative   HEENT:  normocephalic, atraumatic   Lungs:   clear to auscultation bilaterally   Heart:   regular rate and rhythm, S1, S2 normal   Abdomen:  gravid, non-tender   Extremities non-tender, no edema   Derm: no rashes or lesions   Psych: appropriate mood and affect   Pelvis:  adequate         Lab Review  GBS: negative      Assessment:     28 y.o.  at 37w0d weeks gestation.  IUGR  H/o previous gastric sleeve  Seizure disorder    There are no hospital problems to display for this patient.         Plan:     1. Risks, benefits, alternatives and possible complications have been discussed in detail with the patient. All questions have been answered, and Ms. Sharma has voiced understanding and agrees to the treatment plan.  2. Consents signed and in chart  3. Admit to Labor and Delivery unit  4. Pitocin induction

## 2023-12-06 NOTE — PLAN OF CARE
Problem: Adult Inpatient Plan of Care  Goal: Plan of Care Review  2023 174 by Jah Ling LPN  Outcome: Ongoing, Progressing  2023 1743 by Jah Ling LPN  Outcome: Ongoing, Not Progressing  Goal: Patient-Specific Goal (Individualized)  2023 174 by Jah Ling LPN  Outcome: Ongoing, Progressing  2023 1743 by Jah Ling LPN  Outcome: Ongoing, Not Progressing  Goal: Absence of Hospital-Acquired Illness or Injury  2023 174 by Jah Ling LPN  Outcome: Ongoing, Progressing  2023 1743 by Jah Ling LPN  Outcome: Ongoing, Not Progressing  Goal: Optimal Comfort and Wellbeing  2023 by Jah Ling LPN  Outcome: Ongoing, Progressing  2023 by Jah Ling LPN  Outcome: Ongoing, Not Progressing  Goal: Readiness for Transition of Care  2023 174 by Jah Ling LPN  Outcome: Ongoing, Progressing  2023 1743 by Jah Ling LPN  Outcome: Ongoing, Not Progressing     Problem: Infection  Goal: Absence of Infection Signs and Symptoms  2023 by Jah Ling LPN  Outcome: Ongoing, Progressing  2023 by Jah Ling LPN  Outcome: Ongoing, Not Progressing     Problem:  Fall Injury Risk  Goal: Absence of Fall, Infant Drop and Related Injury  2023 by Jah Ling LPN  Outcome: Ongoing, Progressing  2023 by Jah Ling LPN  Outcome: Ongoing, Not Progressing

## 2023-12-07 LAB
BASOPHILS # BLD AUTO: 0.04 X10(3)/MCL
BASOPHILS NFR BLD AUTO: 0.2 %
EOSINOPHIL # BLD AUTO: 0.01 X10(3)/MCL (ref 0–0.9)
EOSINOPHIL NFR BLD AUTO: 0 %
ERYTHROCYTE [DISTWIDTH] IN BLOOD BY AUTOMATED COUNT: 13.4 % (ref 11.5–17)
HCT VFR BLD AUTO: 28.4 % (ref 37–47)
HGB BLD-MCNC: 9.1 G/DL (ref 12–16)
IMM GRANULOCYTES # BLD AUTO: 0.19 X10(3)/MCL (ref 0–0.04)
IMM GRANULOCYTES NFR BLD AUTO: 0.9 %
LYMPHOCYTES # BLD AUTO: 2.81 X10(3)/MCL (ref 0.6–4.6)
LYMPHOCYTES NFR BLD AUTO: 14 %
MCH RBC QN AUTO: 28.7 PG (ref 27–31)
MCHC RBC AUTO-ENTMCNC: 32 G/DL (ref 33–36)
MCV RBC AUTO: 89.6 FL (ref 80–94)
MONOCYTES # BLD AUTO: 0.88 X10(3)/MCL (ref 0.1–1.3)
MONOCYTES NFR BLD AUTO: 4.4 %
NEUTROPHILS # BLD AUTO: 16.21 X10(3)/MCL (ref 2.1–9.2)
NEUTROPHILS NFR BLD AUTO: 80.5 %
NRBC BLD AUTO-RTO: 0 %
PLATELET # BLD AUTO: 228 X10(3)/MCL (ref 130–400)
PMV BLD AUTO: 11 FL (ref 7.4–10.4)
RBC # BLD AUTO: 3.17 X10(6)/MCL (ref 4.2–5.4)
WBC # SPEC AUTO: 20.14 X10(3)/MCL (ref 4.5–11.5)

## 2023-12-07 PROCEDURE — 11000001 HC ACUTE MED/SURG PRIVATE ROOM

## 2023-12-07 PROCEDURE — 25000003 PHARM REV CODE 250: Performed by: OBSTETRICS & GYNECOLOGY

## 2023-12-07 PROCEDURE — 85025 COMPLETE CBC W/AUTO DIFF WBC: CPT | Performed by: OBSTETRICS & GYNECOLOGY

## 2023-12-07 PROCEDURE — 63600175 PHARM REV CODE 636 W HCPCS: Performed by: OBSTETRICS & GYNECOLOGY

## 2023-12-07 RX ORDER — OXYCODONE AND ACETAMINOPHEN 5; 325 MG/1; MG/1
1 TABLET ORAL EVERY 4 HOURS PRN
Qty: 20 TABLET | Refills: 0 | Status: SHIPPED | OUTPATIENT
Start: 2023-12-07

## 2023-12-07 RX ORDER — IBUPROFEN 800 MG/1
800 TABLET ORAL EVERY 8 HOURS
Qty: 30 TABLET | Refills: 0 | Status: SHIPPED | OUTPATIENT
Start: 2023-12-07

## 2023-12-07 RX ADMIN — KETOROLAC TROMETHAMINE 30 MG: 30 INJECTION, SOLUTION INTRAMUSCULAR at 06:12

## 2023-12-07 RX ADMIN — IBUPROFEN 800 MG: 800 TABLET, FILM COATED ORAL at 09:12

## 2023-12-07 RX ADMIN — LEVETIRACETAM 1000 MG: 500 TABLET, FILM COATED ORAL at 09:12

## 2023-12-07 RX ADMIN — KETOROLAC TROMETHAMINE 30 MG: 30 INJECTION, SOLUTION INTRAMUSCULAR at 01:12

## 2023-12-07 RX ADMIN — PRENATAL VITAMINS-IRON FUMARATE 27 MG IRON-FOLIC ACID 0.8 MG TABLET 1 TABLET: at 08:12

## 2023-12-07 RX ADMIN — LEVETIRACETAM 1000 MG: 500 TABLET, FILM COATED ORAL at 08:12

## 2023-12-07 RX ADMIN — DOCUSATE SODIUM 200 MG: 100 CAPSULE, LIQUID FILLED ORAL at 08:12

## 2023-12-07 RX ADMIN — DOCUSATE SODIUM 200 MG: 100 CAPSULE, LIQUID FILLED ORAL at 09:12

## 2023-12-07 NOTE — PROGRESS NOTES
PostPartum Progress Note        Subjective:      Post-Operative Day #1 after  delivery secondary to IUGR and NRFHT .    Patient is without complaints. Lochia decreasing. Breast feeding. Pain is well controlled. Patient is ambulating. Tolerating Full Regular diet.Overall mother and baby are doing well.     Objective:      Temp:  [97.6 °F (36.4 °C)-98.5 °F (36.9 °C)] 98.3 °F (36.8 °C)  Pulse:  [] 66  Resp:  [14-20] 18  SpO2:  [97 %-100 %] 97 %  BP: ()/(54-88) 105/72    Intake/Output Summary (Last 24 hours) at 2023 0815  Last data filed at 2023 0030  Gross per 24 hour   Intake 850 ml   Output 1910 ml   Net -1060 ml     Body mass index is 27.91 kg/m².    General: no acute distress  Abdomen: soft, non-tender, non-distended; Fundus firm and below the umbilicus  Incision- intact, healing well, no sign of infection  Extremities: non-tender, symmetric, trace edema    Group & Rh   Date Value Ref Range Status   2023 O NEG  Final     Recent Results (from the past 336 hour(s))   CBC with Differential    Collection Time: 23  5:34 AM   Result Value Ref Range    WBC 20.14 (H) 4.50 - 11.50 x10(3)/mcL    Hgb 9.1 (L) 12.0 - 16.0 g/dL    Hct 28.4 (L) 37.0 - 47.0 %    Platelet 228 130 - 400 x10(3)/mcL   CBC with Differential    Collection Time: 23  5:33 AM   Result Value Ref Range    WBC 14.09 (H) 4.50 - 11.50 x10(3)/mcL    Hgb 11.5 (L) 12.0 - 16.0 g/dL    Hct 35.6 (L) 37.0 - 47.0 %    Platelet 306 130 - 400 x10(3)/mcL          Assessment:     28 y.o.  S/P  Delivery Post-Operative Day #1  - Doing Well      Plan:     1. Continue routine postpartum care  2. Plan for D/C in AM  3. Mild acute blood loss anemia--asymptomatic. No intervention necessary.

## 2023-12-07 NOTE — ANESTHESIA POSTPROCEDURE EVALUATION
Anesthesia Post Evaluation    Patient: Dahlia Sharma    Procedure(s) Performed: * No procedures listed *    Final Anesthesia Type: epidural      Patient location during evaluation: floor  Patient participation: Yes- Able to Participate  Level of consciousness: awake and alert and oriented  Post-procedure vital signs: reviewed and stable  Pain management: adequate  Airway patency: patent    PONV status at discharge: No PONV  Anesthetic complications: no      Cardiovascular status: blood pressure returned to baseline  Respiratory status: unassisted, spontaneous ventilation and room air  Hydration status: euvolemic  Follow-up not needed.  Comments: Denies headache, mod-severe backpain, or lower extremity motor weekness              Vitals Value Taken Time   /72 12/07/23 0757   Temp 36.8 °C (98.3 °F) 12/07/23 0757   Pulse 66 12/07/23 0757   Resp 18 12/06/23 1704   SpO2 97 % 12/06/23 1704         No case tracking events are documented in the log.      Pain/Cuco Score: Pain Rating Prior to Med Admin: 2 (12/7/2023  6:15 AM)  Pain Rating Post Med Admin: 2 (12/6/2023 11:05 PM)  Cuco Score: 9 (12/6/2023  5:04 PM)

## 2023-12-07 NOTE — PLAN OF CARE
Problem: Breastfeeding  Goal: Effective Breastfeeding  Outcome: Ongoing, Progressing  Intervention: Promote Effective Breastfeeding  Flowsheets (Taken 12/6/2023 2305)  Breastfeeding Assistance: support offered  Parent/Child Attachment Promotion:   strengths emphasized   positive reinforcement provided  Intervention: Support Exclusive Breastfeeding Success  Flowsheets (Taken 12/6/2023 2305)  Supportive Measures:   counseling provided   active listening utilized   verbalization of feelings encouraged  Breastfeeding Support:   encouragement provided   lactation counseling provided

## 2023-12-07 NOTE — PLAN OF CARE
Problem: Adult Inpatient Plan of Care  Goal: Plan of Care Review  2023 by Jah Ling LPN  Outcome: Ongoing, Progressing  2023 174 by Jah Ling LPN  Outcome: Ongoing, Progressing  2023 1743 by Jah Ling LPN  Outcome: Ongoing, Not Progressing  Goal: Patient-Specific Goal (Individualized)  2023 07 by Jah Ling LPN  Outcome: Ongoing, Progressing  2023 174 by Jah Ling LPN  Outcome: Ongoing, Progressing  2023 1743 by Jah Ling LPN  Outcome: Ongoing, Not Progressing  Goal: Absence of Hospital-Acquired Illness or Injury  2023 by Jah Ling LPN  Outcome: Ongoing, Progressing  2023 174 by Jah Ling LPN  Outcome: Ongoing, Progressing  2023 1743 by Jah Ling LPN  Outcome: Ongoing, Not Progressing  Goal: Optimal Comfort and Wellbeing  2023 by Jah Ling LPN  Outcome: Ongoing, Progressing  2023 174 by Jah Ling LPN  Outcome: Ongoing, Progressing  2023 1743 by Jah Ling LPN  Outcome: Ongoing, Not Progressing  Goal: Readiness for Transition of Care  2023 by Jah Ling LPN  Outcome: Ongoing, Progressing  2023 174 by Jah Ling LPN  Outcome: Ongoing, Progressing  2023 1743 by Jah Ling LPN  Outcome: Ongoing, Not Progressing     Problem: Infection  Goal: Absence of Infection Signs and Symptoms  2023 0738 by Jah Ling LPN  Outcome: Ongoing, Progressing  2023 174 by Jah Ling LPN  Outcome: Ongoing, Progressing  2023 by Jah Ling LPN  Outcome: Ongoing, Not Progressing     Problem:  Fall Injury Risk  Goal: Absence of Fall, Infant Drop and Related Injury  2023 by Jah Ling LPN  Outcome: Ongoing, Progressing  2023 174 by Jah Ling LPN  Outcome: Ongoing, Progressing  20233 by Jah Ling LPN  Outcome: Ongoing, Not Progressing     Problem:  Breastfeeding  Goal: Effective Breastfeeding  Outcome: Ongoing, Progressing

## 2023-12-08 VITALS
TEMPERATURE: 99 F | HEIGHT: 69 IN | HEART RATE: 80 BPM | SYSTOLIC BLOOD PRESSURE: 121 MMHG | WEIGHT: 189 LBS | OXYGEN SATURATION: 97 % | BODY MASS INDEX: 27.99 KG/M2 | RESPIRATION RATE: 18 BRPM | DIASTOLIC BLOOD PRESSURE: 80 MMHG

## 2023-12-08 PROCEDURE — 25000003 PHARM REV CODE 250: Performed by: OBSTETRICS & GYNECOLOGY

## 2023-12-08 RX ADMIN — PRENATAL VITAMINS-IRON FUMARATE 27 MG IRON-FOLIC ACID 0.8 MG TABLET 1 TABLET: at 08:12

## 2023-12-08 RX ADMIN — LEVETIRACETAM 1000 MG: 500 TABLET, FILM COATED ORAL at 08:12

## 2023-12-08 RX ADMIN — DOCUSATE SODIUM 200 MG: 100 CAPSULE, LIQUID FILLED ORAL at 08:12

## 2023-12-08 RX ADMIN — IBUPROFEN 800 MG: 800 TABLET, FILM COATED ORAL at 05:12

## 2023-12-08 NOTE — DISCHARGE SUMMARY
"Delivery Discharge Summary  Obstetrics      Primary OB Clinician: Sharmila Trejo MD    Discharge Provider: ROCK PALM NP    Admission date: 2023  Discharge date: 2023    Admit Dx:   Discharge Dx:    Patient Active Problem List   Diagnosis    Anxiety    Syncope    Reactive hypoglycemia    Generalized epilepsy    Nocturnal seizures    Localization-related (focal) (partial) idiopathic epilepsy and epileptic syndromes with seizures of localized onset, not intractable, without status epilepticus    19 weeks gestation of pregnancy    IUGR (intrauterine growth restriction) affecting care of mother    Non-reassuring fetal heart tones, delivered, current hospitalization     delivery delivered       Procedure: , due to IUGR and [facility]    Hospital Course:  Dahlia Sharma is a 28 y.o. now  who was admitted on 2023 for delivery. Patient delivered a viable . Please see delivery note for further details. Pt was in stable condition post delivery and was transferred to the Mother-Baby Unit. Her postpartum course was uncomplicated. On the date of discharge, patient's pain is controlled with oral pain medications. She is tolerating ambulation without SOB or CP, and PO diet without N/V. Reported lochia is within the normal range. Pt in stable condition and ready for discharge.     Pertinent studies:  Postpartum CBC  Lab Results   Component Value Date    WBC 20.14 (H) 2023    HGB 9.1 (L) 2023    HCT 28.4 (L) 2023    MCV 89.6 2023     2023       Delivery:    Episiotomy:     Lacerations:     Repair suture:     Repair # of packets:     Blood loss (ml):       Birth information:  YOB: 2023   Time of birth: 3:32 PM   Sex: female   Delivery type: , Low Transverse   Gestational Age: 37w0d     Measurements    Weight: 2420 g  Weight (lbs): 5 lb 5.4 oz  Length: 48.3 cm  Length (in): 19"  Head circumference: 31.8 cm         Delivery " Clinician: Delivery Providers    Delivering clinician: Sharmila Trejo MD   Provider Role    Shiloh Wynn RN Circulator    Brittany Melton RN Circulator    Sharmila Membreno, [facility]    Hiren Johnson, [facility]    Leia Lemus RN NICU    Karlene Perez, RRT Respiratory Therapist    Stephanie Fonseca RN Registered Nurse             Additional  information:  Forceps:    Vacuum:    Breech:    Observed anomalies      Living?:     Apgars    Living status: Living  Apgar Component Scores:  1 min.:  5 min.:  10 min.:  15 min.:  20 min.:    Skin color:  0  1       Heart rate:  2  2       Reflex irritability:  2  2       Muscle tone:  2  2       Respiratory effort:  2  2       Total:  8  9       Apgars assigned by: MAL FONSECA RN         Placenta: Delivered:       appearance    Disposition: To home, self care    Follow Up: 2 weeks    Patient Instructions:   1. Call the office for any bleeding >2 pads/hour for >2 hours, temperature >100.4, pain that is uncontrolled with medications, or for any other concerns.  2. Pelvic rest and no tub baths x 6 weeks.  3. No driving while on narcotics.    Current Discharge Medication List        START taking these medications    Details   ibuprofen (ADVIL,MOTRIN) 800 MG tablet Take 1 tablet (800 mg total) by mouth every 8 (eight) hours.  Qty: 30 tablet, Refills: 0      oxyCODONE-acetaminophen (PERCOCET) 5-325 mg per tablet Take 1 tablet by mouth every 4 (four) hours as needed for Pain.  Qty: 20 tablet, Refills: 0    Comments: Quantity prescribed more than 7 day supply? No           CONTINUE these medications which have NOT CHANGED    Details   folic acid (FOLVITE) 1 MG tablet Take 4 tablets (4 mg total) by mouth once daily.  Qty: 360 tablet, Refills: 3      levETIRAcetam (KEPPRA) 1000 MG tablet Take 1 tablet (1,000 mg total) by mouth 2 (two) times daily.  Qty: 60 tablet, Refills: 5    Associated Diagnoses: Localization-related (focal) (partial) idiopathic epilepsy and  epileptic syndromes with seizures of localized onset, not intractable, without status epilepticus; 27 weeks gestation of pregnancy      prenatal no122/iron/folic acid (PRENATAL MULTI ORAL) Take by mouth.             ROCK PALM

## 2023-12-10 ENCOUNTER — PATIENT MESSAGE (OUTPATIENT)
Dept: ADMINISTRATIVE | Facility: OTHER | Age: 28
End: 2023-12-10
Payer: COMMERCIAL

## 2024-01-22 NOTE — ED PROVIDER NOTES
[facility]W NOTE     Admit Date: 2023  [facility] Physician: Fredo Breaux  Primary OBGYN: Dr. Bowen    Admit Diagnosis/Chief Complaint: Decreased Fetal Movement      Chief Complaint   Patient presents with    Decreased Fetal Movement     G1 35.5 week iup  with c/o decreased fetal movement; pt states she has only felt the baby move once in the last 24 hours       Hospital Course:  Dahlia Sharma is a 28 y.o.  at Unknown presents complaining of decrease fetal movement.  This IUP is complicated by IUGR.    Patient denies vaginal bleeding, leakage of fluid, and contractions.  Fetal Movement: decreased    /81   Pulse 90   Temp 97.9 °F (36.6 °C)   Resp 18   Breastfeeding No        General: in no apparent distress  Abdominal: soft, nontender, nondistended, no abnormal masses, no epigastric pain FHT present  Back: not examined   CVA tenderness not examined  Extremeties no redness or tenderness in the calves or thighs no edema    SSE:   SVE:      FHT:  Reactive  TOCO: Contractions none      Biophysical Profile:       LABS:   No results found for this or any previous visit (from the past 24 hour(s)).  [unfilled]     Imaging Results              US OB 14+ Weeks TransAbd, w/Biophysical Profile, w/o NST, Single Gestation (xpd) (Final result)  Result time 23 19:26:44      Final result by Markel Ospina MD (23 19:26:44)                   Impression:      Biophysical profile .      Electronically signed by: Markel Ospina  Date:    2023  Time:    19:26               Narrative:    EXAMINATION:  US OB 14+ WEEKS, TRANSABDOM W/ BIOPHYSICAL PROFILE, W/O NST, SINGLE GESTATION (XPD)    CLINICAL HISTORY:  decrease FM;    FINDINGS:  Presentation is vertex. Fetal heart rate 140-141 BPM.  The RICH is 16 cm. The single deepest pocket is 4.8 cm.  Posterior placenta.    2 Fetal Breathing    2 Fetal Movement    2 Fetal Tone    2 Amniotic Fluid    8 Total                                        ULTRASOUND (Final result)  Result time 23 11:11:52      Final result by Unknown User (23 11:11:52)                                         ASSESMENT: Dahlia Sharma is a 28 y.o.   at Unknown with decrease fetal movement.  Observation in [facility]  Discussed fetal kick counts  Labor precautions reviewed with patient  ER precautions reviewed with patient  Patient was given an opportunity to ask questions  Patient is to follow-up with her primary care physician        Discharge Diagnosis/Clinical Impression**: Normal pregnancy concern for fetal well being    Status:Stable    Patient Instructions:       - Pt was given routine pregnancy instructions including to return to triage if she had any vaginal bleeding (other than spotting for the next 48hrs), any loss of fluid like her water broke, decreased fetal movement, or contractions Q 5min lasting for 2 or more hours. Pt was also instructed to drink copious water. Patient voiced understanding of all these instructions and was subsequently discharged home.    She will follow up with her primary OB.      This note was created with the assistance of [employer] voice recognition software. There may be transcription errors as a result of using this technology however minimal. Effort has been made to assure accuracy of transcription but any obvious errors or omissions should be clarified with the author of the document.         Late note entry, I was available and involved at the time of encounter.

## 2024-02-01 VITALS
OXYGEN SATURATION: 100 % | RESPIRATION RATE: 14 BRPM | HEART RATE: 77 BPM | DIASTOLIC BLOOD PRESSURE: 79 MMHG | SYSTOLIC BLOOD PRESSURE: 117 MMHG

## 2024-02-01 NOTE — TRANSFER OF CARE
"Anesthesia Transfer of Care Note    Patient: Dahlia Sharma    Procedure(s) Performed: Procedure(s) (LRB):   SECTION (N/A)    Patient location: Labor and Delivery    Anesthesia Type: epidural    Transport from OR: Transported from OR on room air with adequate spontaneous ventilation    Post pain: adequate analgesia    Post assessment: no apparent anesthetic complications    Post vital signs: stable    Level of consciousness: awake    Nausea/Vomiting: no nausea/vomiting    Complications: none    Transfer of care protocol was followed      Last vitals: Visit Vitals  /80   Pulse 80   Temp 36.9 °C (98.5 °F) (Oral)   Resp 18   Ht 5' 9" (1.753 m)   Wt 85.7 kg (189 lb)   SpO2 97%   Breastfeeding Unknown   BMI 27.91 kg/m²   Note transferred from original record - original note written by Molly Quintero CRNA with addendum to correct anesthesia type.  "

## 2024-02-01 NOTE — ADDENDUM NOTE
Addendum  created 02/01/24 0634 by Ana Lilia Leach MD    Intraprocedure Event edited, Intraprocedure Staff edited

## 2024-02-05 ENCOUNTER — OFFICE VISIT (OUTPATIENT)
Dept: NEUROLOGY | Facility: CLINIC | Age: 29
End: 2024-02-05
Payer: COMMERCIAL

## 2024-02-05 VITALS
WEIGHT: 189 LBS | HEIGHT: 69 IN | SYSTOLIC BLOOD PRESSURE: 126 MMHG | BODY MASS INDEX: 27.99 KG/M2 | DIASTOLIC BLOOD PRESSURE: 84 MMHG

## 2024-02-05 DIAGNOSIS — G40.309 GENERALIZED EPILEPSY: Primary | ICD-10-CM

## 2024-02-05 DIAGNOSIS — R56.9 NOCTURNAL SEIZURES: ICD-10-CM

## 2024-02-05 DIAGNOSIS — Z3A.27 27 WEEKS GESTATION OF PREGNANCY: ICD-10-CM

## 2024-02-05 DIAGNOSIS — G40.009 LOCALIZATION-RELATED (FOCAL) (PARTIAL) IDIOPATHIC EPILEPSY AND EPILEPTIC SYNDROMES WITH SEIZURES OF LOCALIZED ONSET, NOT INTRACTABLE, WITHOUT STATUS EPILEPTICUS: ICD-10-CM

## 2024-02-05 PROCEDURE — 3008F BODY MASS INDEX DOCD: CPT | Mod: CPTII,S$GLB,, | Performed by: SPECIALIST

## 2024-02-05 PROCEDURE — 99213 OFFICE O/P EST LOW 20 MIN: CPT | Mod: S$GLB,,, | Performed by: SPECIALIST

## 2024-02-05 PROCEDURE — 3079F DIAST BP 80-89 MM HG: CPT | Mod: CPTII,S$GLB,, | Performed by: SPECIALIST

## 2024-02-05 PROCEDURE — 3074F SYST BP LT 130 MM HG: CPT | Mod: CPTII,S$GLB,, | Performed by: SPECIALIST

## 2024-02-05 PROCEDURE — 99999 PR PBB SHADOW E&M-EST. PATIENT-LVL III: CPT | Mod: PBBFAC,,, | Performed by: SPECIALIST

## 2024-02-05 PROCEDURE — 1159F MED LIST DOCD IN RCRD: CPT | Mod: CPTII,S$GLB,, | Performed by: SPECIALIST

## 2024-02-05 RX ORDER — NORETHINDRONE 0.35 MG/1
1 TABLET ORAL
COMMUNITY
Start: 2024-01-25

## 2024-02-05 RX ORDER — LEVETIRACETAM 500 MG/1
500 TABLET ORAL 2 TIMES DAILY
Qty: 180 TABLET | Refills: 3 | Status: SHIPPED | OUTPATIENT
Start: 2024-02-05 | End: 2025-01-30

## 2024-02-05 NOTE — PROGRESS NOTES
"  Subjective:         Patient ID: Dahlia Sharma is a 28 y.o. female.    Chief Complaint: follow up  epilepsy/seizure / paroxysmal spells    HPI:           F/U Sz.  (Pts last Sz was abt 4-5 months ago. Pt odilia. Had her baby in December 2023 and she as hoping to go back to Keppra 500mg bid. )      Comments from prior visit(s):  ...10/22 1st vis [facility]   2022 nonspecif EEG   AED titrated dur pregnancy           Current Outpatient Medications   Medication Instructions    folic acid (FOLVITE) 4 mg, Oral, Daily    LINCOLN 0.35 mg tablet 1 tablet, Oral    ibuprofen (ADVIL,MOTRIN) 800 mg, Oral, Every 8 hours    levETIRAcetam (KEPPRA) 1,000 mg, Oral, 2 times daily    oxyCODONE-acetaminophen (PERCOCET) 5-325 mg per tablet 1 tablet, Oral, Every 4 hours PRN    prenatal no122/iron/folic acid (PRENATAL MULTI ORAL) Oral     Medications Discontinued During This Encounter   Medication Reason    levETIRAcetam (KEPPRA) 1000 MG tablet Reorder         Objective:      Exam  Visit Vitals  /84   Ht 5' 9" (1.753 m)   Wt 85.7 kg (189 lb)   BMI 27.91 kg/m²       General:   If accompanied, by:_  heart__    Neurological    Speech: normal   vis fields:  EOMs:  motor  coord:   Gait:         Assessment/Plan:         ICD-10-CM ICD-9-CM   1. Generalized epilepsy  G40.309 345.90   2. Nocturnal seizures  R56.9 780.39   3. Localization-related (focal) (partial) idiopathic epilepsy and epileptic syndromes with seizures of localized onset, not intractable, without status epilepticus  G40.009 345.50   4. 27 weeks gestation of pregnancy  Z3A.27 V22.2       Other comments/ follow up:        Decr LVT to 500 / 1000mg one week then 500 bid   No orders of the defined types were placed in this encounter.     Medications Ordered This Encounter   Medications    levETIRAcetam (KEPPRA) 500 MG Tab     Sig: Take 1 tablet (500 mg total) by mouth 2 (two) times daily.     Dispense:  180 tablet     Refill:  3       Seizure medications can be associated with " certain side effects, including memory dysfunction or mood disorders. Excessive daytime sleepiness may occur sometimes leading to car crashes.  Abrupt stoppage of anticonvulsant medications can be medically troublesome.      Aim virtual 6 mos     MD EJ TanA

## 2024-05-06 DIAGNOSIS — G40.309 GENERALIZED EPILEPSY: Primary | ICD-10-CM

## 2024-05-07 RX ORDER — FOLIC ACID 1 MG/1
TABLET ORAL
Qty: 120 TABLET | Refills: 11 | Status: SHIPPED | OUTPATIENT
Start: 2024-05-07

## 2024-07-08 PROBLEM — Z3A.19 19 WEEKS GESTATION OF PREGNANCY: Status: RESOLVED | Noted: 2023-08-01 | Resolved: 2024-07-08

## 2024-08-08 ENCOUNTER — OFFICE VISIT (OUTPATIENT)
Dept: NEUROLOGY | Facility: CLINIC | Age: 29
End: 2024-08-08
Payer: COMMERCIAL

## 2024-08-08 DIAGNOSIS — Z3A.27 27 WEEKS GESTATION OF PREGNANCY: ICD-10-CM

## 2024-08-08 DIAGNOSIS — G40.309 GENERALIZED EPILEPSY: Primary | ICD-10-CM

## 2024-08-08 DIAGNOSIS — G40.009 LOCALIZATION-RELATED (FOCAL) (PARTIAL) IDIOPATHIC EPILEPSY AND EPILEPTIC SYNDROMES WITH SEIZURES OF LOCALIZED ONSET, NOT INTRACTABLE, WITHOUT STATUS EPILEPTICUS: ICD-10-CM

## 2024-08-08 PROCEDURE — 99214 OFFICE O/P EST MOD 30 MIN: CPT | Mod: 95,,, | Performed by: SPECIALIST

## 2024-08-08 RX ORDER — LEVETIRACETAM 500 MG/1
TABLET ORAL
Qty: 360 TABLET | Refills: 3 | Status: SHIPPED | OUTPATIENT
Start: 2024-08-08

## 2024-09-13 ENCOUNTER — PATIENT MESSAGE (OUTPATIENT)
Dept: NEUROLOGY | Facility: CLINIC | Age: 29
End: 2024-09-13
Payer: COMMERCIAL

## 2024-09-13 DIAGNOSIS — G40.009 LOCALIZATION-RELATED (FOCAL) (PARTIAL) IDIOPATHIC EPILEPSY AND EPILEPTIC SYNDROMES WITH SEIZURES OF LOCALIZED ONSET, NOT INTRACTABLE, WITHOUT STATUS EPILEPTICUS: ICD-10-CM

## 2024-09-13 DIAGNOSIS — Z3A.27 27 WEEKS GESTATION OF PREGNANCY: ICD-10-CM

## 2024-09-16 RX ORDER — LEVETIRACETAM 500 MG/1
500 TABLET ORAL 2 TIMES DAILY
Qty: 180 TABLET | Refills: 3 | Status: SHIPPED | OUTPATIENT
Start: 2024-09-16

## 2024-09-30 ENCOUNTER — CLINICAL SUPPORT (OUTPATIENT)
Dept: URGENT CARE | Facility: CLINIC | Age: 29
End: 2024-09-30
Payer: COMMERCIAL

## 2024-09-30 VITALS — RESPIRATION RATE: 17 BRPM

## 2024-09-30 DIAGNOSIS — Z11.1 PPD SCREENING TEST: Primary | ICD-10-CM

## 2024-09-30 PROCEDURE — 86580 TB INTRADERMAL TEST: CPT | Mod: ,,, | Performed by: FAMILY MEDICINE

## 2024-09-30 NOTE — PROGRESS NOTES
Subjective:      Patient ID: Dahlia Sharma is a 29 y.o. female.    Vitals:  respiration is 17.     Chief Complaint: PPD Reading     Patient is a 29 y.o. female who presents to urgent care for routine ppd, administered to the left forearm.     ROS   Objective:     Physical Exam    Assessment:     1. PPD screening test        Plan:       PPD screening test  -     POCT TB Skin Test Read

## 2024-10-03 ENCOUNTER — CLINICAL SUPPORT (OUTPATIENT)
Dept: URGENT CARE | Facility: CLINIC | Age: 29
End: 2024-10-03
Payer: COMMERCIAL

## 2024-10-03 DIAGNOSIS — Z11.1 ENCOUNTER FOR PPD SKIN TEST READING: Primary | ICD-10-CM

## 2024-10-03 LAB
TB INDURATION - 48 HR READ: NORMAL
TB INDURATION - 72 HR READ: 0 MM
TB SKIN TEST - 48 HR READ: NORMAL
TB SKIN TEST - 72 HR READ: NEGATIVE

## 2024-10-03 NOTE — PROGRESS NOTES
PPD Reading Note  PPD read and results entered in [employer].  Result: 0 mm induration.  Interpretation: negative

## 2024-12-04 ENCOUNTER — PATIENT MESSAGE (OUTPATIENT)
Dept: NEUROLOGY | Facility: CLINIC | Age: 29
End: 2024-12-04
Payer: COMMERCIAL

## 2024-12-04 DIAGNOSIS — G40.009 LOCALIZATION-RELATED (FOCAL) (PARTIAL) IDIOPATHIC EPILEPSY AND EPILEPTIC SYNDROMES WITH SEIZURES OF LOCALIZED ONSET, NOT INTRACTABLE, WITHOUT STATUS EPILEPTICUS: Primary | ICD-10-CM

## 2024-12-04 DIAGNOSIS — Z3A.27 27 WEEKS GESTATION OF PREGNANCY: ICD-10-CM

## 2024-12-05 NOTE — ANESTHESIA PROCEDURE NOTES
Epidural    Patient location during procedure: OB   Reason for block: primary anesthetic   Reason for block: labor analgesia requested by patient and obstetrician  Diagnosis: Labor   Start time: 12/6/2023 1:13 PM  Timeout: 12/6/2023 1:11 PM  End time: 12/6/2023 1:27 PM    Staffing  Performing Provider: Elijah Gannon MD  Authorizing Provider: Elijah Gannon MD    Staffing  Performed by: Elijah Gannon MD  Authorized by: Elijah Gannon MD        Preanesthetic Checklist  Completed: patient identified, IV checked, site marked, risks and benefits discussed, surgical consent, monitors and equipment checked, pre-op evaluation, timeout performed, anesthesia consent given, hand hygiene performed and patient being monitored  Preparation  Patient position: sitting  Prep: ChloraPrep  Patient monitoring: Pulse Ox and Blood Pressure  Reason for block: primary anesthetic   Epidural  Skin Anesthetic: lidocaine 1%  Administration type: continuous  Approach: midline  Interspace: L3-4    Injection technique: ROMELIA air  Needle and Epidural Catheter  Needle type: Tuohy   Needle gauge: 17    Additional Documentation: incremental injection and negative aspiration for heme and CSF  Needle localization: anatomical landmarks  Medications:  Volume per aspiration: 5 mL   Assessment  Ease of block: easy  Patient's tolerance of the procedure: no complaints No inadvertent dural puncture with Tuohy.  Dural puncture not performed with spinal needle    Medications:    Medications: bupivacaine (pf) (MARCAINE) injection 0.25% - Epidural   10 mL - 12/6/2023 1:26:00 PM         No

## 2024-12-23 ENCOUNTER — LAB VISIT (OUTPATIENT)
Dept: LAB | Facility: HOSPITAL | Age: 29
End: 2024-12-23
Payer: COMMERCIAL

## 2024-12-23 DIAGNOSIS — Z3A.27 27 WEEKS GESTATION OF PREGNANCY: ICD-10-CM

## 2024-12-23 DIAGNOSIS — G40.009 LOCALIZATION-RELATED (FOCAL) (PARTIAL) IDIOPATHIC EPILEPSY AND EPILEPTIC SYNDROMES WITH SEIZURES OF LOCALIZED ONSET, NOT INTRACTABLE, WITHOUT STATUS EPILEPTICUS: ICD-10-CM

## 2024-12-23 PROCEDURE — 36415 COLL VENOUS BLD VENIPUNCTURE: CPT

## 2024-12-23 PROCEDURE — 80177 DRUG SCRN QUAN LEVETIRACETAM: CPT

## (undated) DEVICE — SUT 2-0 VICRYL / CT-1

## (undated) DEVICE — ELECTRODE REM PLYHSV RETURN 9

## (undated) DEVICE — PAD UNDERPAD 30X30

## (undated) DEVICE — SUT CTD VICRYL 0 UND BR CT

## (undated) DEVICE — PAD SANITARY OB STERILE

## (undated) DEVICE — BULB SYRINGE EAR IRRIGATION

## (undated) DEVICE — SUT 3/0 36IN COATED VICRYL

## (undated) DEVICE — CAP BABY BEANIE

## (undated) DEVICE — Device

## (undated) DEVICE — SOL WATER STRL IRR 1000ML

## (undated) DEVICE — SEE MEDLINE ITEM 156931

## (undated) DEVICE — SUT MONOCRYL 4-0 PS-1 UND

## (undated) DEVICE — SUT 2/0 27IN PLAIN GUT CT

## (undated) DEVICE — SUT CTD VICRYL 1 VIL BR CTX

## (undated) DEVICE — BINDER ABDOM 4PANEL 12IN LG/XL

## (undated) DEVICE — SEE MEDLINE ITEM 157117

## (undated) DEVICE — SOL NACL IRR 1000ML BTL